# Patient Record
Sex: FEMALE | Race: WHITE | NOT HISPANIC OR LATINO | Employment: OTHER
[De-identification: names, ages, dates, MRNs, and addresses within clinical notes are randomized per-mention and may not be internally consistent; named-entity substitution may affect disease eponyms.]

---

## 2017-06-06 ENCOUNTER — RECORDS - HEALTHEAST (OUTPATIENT)
Dept: ADMINISTRATIVE | Facility: OTHER | Age: 77
End: 2017-06-06

## 2017-10-20 ENCOUNTER — RECORDS - HEALTHEAST (OUTPATIENT)
Dept: ADMINISTRATIVE | Facility: OTHER | Age: 77
End: 2017-10-20

## 2017-11-08 ENCOUNTER — COMMUNICATION - HEALTHEAST (OUTPATIENT)
Dept: SCHEDULING | Facility: CLINIC | Age: 77
End: 2017-11-08

## 2017-11-20 ENCOUNTER — OFFICE VISIT - HEALTHEAST (OUTPATIENT)
Dept: INTERNAL MEDICINE | Facility: CLINIC | Age: 77
End: 2017-11-20

## 2017-11-20 ENCOUNTER — AMBULATORY - HEALTHEAST (OUTPATIENT)
Dept: INTERNAL MEDICINE | Facility: CLINIC | Age: 77
End: 2017-11-20

## 2017-11-20 DIAGNOSIS — Z78.0 MENOPAUSE: ICD-10-CM

## 2017-11-20 DIAGNOSIS — F43.21 ADJUSTMENT DISORDER WITH DEPRESSED MOOD: ICD-10-CM

## 2017-11-20 DIAGNOSIS — J30.0 CHRONIC VASOMOTOR RHINITIS: ICD-10-CM

## 2017-11-20 ASSESSMENT — MIFFLIN-ST. JEOR: SCORE: 980.26

## 2017-12-03 ENCOUNTER — COMMUNICATION - HEALTHEAST (OUTPATIENT)
Dept: INTERNAL MEDICINE | Facility: CLINIC | Age: 77
End: 2017-12-03

## 2017-12-19 ENCOUNTER — RECORDS - HEALTHEAST (OUTPATIENT)
Dept: ADMINISTRATIVE | Facility: OTHER | Age: 77
End: 2017-12-19

## 2018-01-09 ENCOUNTER — OFFICE VISIT - HEALTHEAST (OUTPATIENT)
Dept: INTERNAL MEDICINE | Facility: CLINIC | Age: 78
End: 2018-01-09

## 2018-01-09 DIAGNOSIS — K44.9 HIATAL HERNIA: ICD-10-CM

## 2018-01-09 DIAGNOSIS — E55.9 VITAMIN D DEFICIENCY: ICD-10-CM

## 2018-01-09 DIAGNOSIS — E78.9 DISORDER OF LIPOID METABOLISM: ICD-10-CM

## 2018-01-09 DIAGNOSIS — F43.21 ADJUSTMENT DISORDER WITH DEPRESSED MOOD: ICD-10-CM

## 2018-05-15 ENCOUNTER — COMMUNICATION - HEALTHEAST (OUTPATIENT)
Dept: INTERNAL MEDICINE | Facility: CLINIC | Age: 78
End: 2018-05-15

## 2018-10-26 ENCOUNTER — COMMUNICATION - HEALTHEAST (OUTPATIENT)
Dept: INTERNAL MEDICINE | Facility: CLINIC | Age: 78
End: 2018-10-26

## 2020-01-30 ENCOUNTER — COMMUNICATION - HEALTHEAST (OUTPATIENT)
Dept: INTERNAL MEDICINE | Facility: CLINIC | Age: 80
End: 2020-01-30

## 2020-02-03 ENCOUNTER — COMMUNICATION - HEALTHEAST (OUTPATIENT)
Dept: SCHEDULING | Facility: CLINIC | Age: 80
End: 2020-02-03

## 2020-07-01 ENCOUNTER — RECORDS - HEALTHEAST (OUTPATIENT)
Dept: ADMINISTRATIVE | Facility: OTHER | Age: 80
End: 2020-07-01

## 2020-09-02 ENCOUNTER — OFFICE VISIT - HEALTHEAST (OUTPATIENT)
Dept: INTERNAL MEDICINE | Facility: CLINIC | Age: 80
End: 2020-09-02

## 2020-09-02 DIAGNOSIS — E78.5 HYPERLIPIDEMIA LDL GOAL <100: ICD-10-CM

## 2020-09-02 DIAGNOSIS — R35.1 NOCTURIA: ICD-10-CM

## 2020-09-02 DIAGNOSIS — J30.1 ALLERGIC RHINITIS DUE TO POLLEN, UNSPECIFIED SEASONALITY: ICD-10-CM

## 2020-09-02 DIAGNOSIS — M17.10 ARTHRITIS OF KNEE: ICD-10-CM

## 2020-09-02 DIAGNOSIS — Z00.00 ENCOUNTER FOR PREVENTIVE CARE: ICD-10-CM

## 2020-09-02 DIAGNOSIS — M85.80 OSTEOPENIA, UNSPECIFIED LOCATION: ICD-10-CM

## 2020-09-02 DIAGNOSIS — Z78.0 MENOPAUSE: ICD-10-CM

## 2020-09-02 LAB
ALBUMIN SERPL-MCNC: 4.2 G/DL (ref 3.5–5)
ALBUMIN UR-MCNC: ABNORMAL MG/DL
ALP SERPL-CCNC: 62 U/L (ref 45–120)
ALT SERPL W P-5'-P-CCNC: 17 U/L (ref 0–45)
AMORPH CRY #/AREA URNS HPF: ABNORMAL /[HPF]
ANION GAP SERPL CALCULATED.3IONS-SCNC: 10 MMOL/L (ref 5–18)
APPEARANCE UR: CLEAR
AST SERPL W P-5'-P-CCNC: 22 U/L (ref 0–40)
BACTERIA #/AREA URNS HPF: ABNORMAL HPF
BILIRUB SERPL-MCNC: 0.5 MG/DL (ref 0–1)
BILIRUB UR QL STRIP: ABNORMAL
BUN SERPL-MCNC: 13 MG/DL (ref 8–28)
CALCIUM SERPL-MCNC: 9.2 MG/DL (ref 8.5–10.5)
CHLORIDE BLD-SCNC: 106 MMOL/L (ref 98–107)
CHOLEST SERPL-MCNC: 216 MG/DL
CO2 SERPL-SCNC: 27 MMOL/L (ref 22–31)
COLOR UR AUTO: YELLOW
CREAT SERPL-MCNC: 0.74 MG/DL (ref 0.6–1.1)
ERYTHROCYTE [DISTWIDTH] IN BLOOD BY AUTOMATED COUNT: 12.4 % (ref 11–14.5)
FASTING STATUS PATIENT QL REPORTED: YES
GFR SERPL CREATININE-BSD FRML MDRD: >60 ML/MIN/1.73M2
GLUCOSE BLD-MCNC: 90 MG/DL (ref 70–125)
GLUCOSE UR STRIP-MCNC: NEGATIVE MG/DL
HCT VFR BLD AUTO: 40.3 % (ref 35–47)
HDLC SERPL-MCNC: 75 MG/DL
HGB BLD-MCNC: 13.3 G/DL (ref 12–16)
HGB UR QL STRIP: ABNORMAL
KETONES UR STRIP-MCNC: NEGATIVE MG/DL
LDLC SERPL CALC-MCNC: 127 MG/DL
LEUKOCYTE ESTERASE UR QL STRIP: ABNORMAL
MCH RBC QN AUTO: 28.8 PG (ref 27–34)
MCHC RBC AUTO-ENTMCNC: 32.9 G/DL (ref 32–36)
MCV RBC AUTO: 88 FL (ref 80–100)
MUCOUS THREADS #/AREA URNS LPF: ABNORMAL LPF
NITRATE UR QL: NEGATIVE
PH UR STRIP: 6 [PH] (ref 5–8)
PLATELET # BLD AUTO: 329 THOU/UL (ref 140–440)
PMV BLD AUTO: 7.6 FL (ref 7–10)
POTASSIUM BLD-SCNC: 4.2 MMOL/L (ref 3.5–5)
PROT SERPL-MCNC: 7.3 G/DL (ref 6–8)
RBC # BLD AUTO: 4.6 MILL/UL (ref 3.8–5.4)
RBC #/AREA URNS AUTO: ABNORMAL HPF
SODIUM SERPL-SCNC: 143 MMOL/L (ref 136–145)
SP GR UR STRIP: >=1.03 (ref 1–1.03)
SQUAMOUS #/AREA URNS AUTO: ABNORMAL LPF
TRANS CELLS #/AREA URNS HPF: ABNORMAL LPF
TRIGL SERPL-MCNC: 70 MG/DL
UROBILINOGEN UR STRIP-ACNC: ABNORMAL
WBC #/AREA URNS AUTO: ABNORMAL HPF
WBC CLUMPS #/AREA URNS HPF: PRESENT /[HPF]
WBC: 6.6 THOU/UL (ref 4–11)

## 2020-09-02 RX ORDER — CELECOXIB 100 MG/1
100 CAPSULE ORAL 2 TIMES DAILY
Status: SHIPPED | COMMUNITY
Start: 2020-09-02 | End: 2021-08-11

## 2020-09-02 RX ORDER — POLYETHYLENE GLYCOL 3350 17 G/17G
17 POWDER, FOR SOLUTION ORAL DAILY
Status: SHIPPED | COMMUNITY
Start: 2020-09-02

## 2020-09-02 RX ORDER — TIMOLOL MALEATE 5 MG/ML
1 SOLUTION/ DROPS OPHTHALMIC 2 TIMES DAILY
Status: SHIPPED | COMMUNITY
Start: 2020-09-02

## 2020-09-02 ASSESSMENT — MIFFLIN-ST. JEOR: SCORE: 940.55

## 2020-09-03 LAB
25(OH)D3 SERPL-MCNC: 53.5 NG/ML (ref 30–80)
25(OH)D3 SERPL-MCNC: 53.5 NG/ML (ref 30–80)
BACTERIA SPEC CULT: NO GROWTH

## 2020-09-08 ENCOUNTER — AMBULATORY - HEALTHEAST (OUTPATIENT)
Dept: SCHEDULING | Facility: CLINIC | Age: 80
End: 2020-09-08

## 2020-09-08 DIAGNOSIS — M85.80 OSTEOPENIA, UNSPECIFIED LOCATION: ICD-10-CM

## 2020-09-08 DIAGNOSIS — Z78.0 MENOPAUSE: ICD-10-CM

## 2020-09-13 ENCOUNTER — COMMUNICATION - HEALTHEAST (OUTPATIENT)
Dept: INTERNAL MEDICINE | Facility: CLINIC | Age: 80
End: 2020-09-13

## 2020-09-13 DIAGNOSIS — R31.29 MICROSCOPIC HEMATURIA: ICD-10-CM

## 2020-09-14 ENCOUNTER — COMMUNICATION - HEALTHEAST (OUTPATIENT)
Dept: INTERNAL MEDICINE | Facility: CLINIC | Age: 80
End: 2020-09-14

## 2020-09-23 ENCOUNTER — COMMUNICATION - HEALTHEAST (OUTPATIENT)
Dept: INTERNAL MEDICINE | Facility: CLINIC | Age: 80
End: 2020-09-23

## 2020-09-23 ENCOUNTER — HOSPITAL ENCOUNTER (OUTPATIENT)
Dept: CT IMAGING | Facility: CLINIC | Age: 80
Discharge: HOME | End: 2020-09-23
Attending: INTERNAL MEDICINE

## 2020-09-23 DIAGNOSIS — R31.29 MICROSCOPIC HEMATURIA: ICD-10-CM

## 2020-10-15 ENCOUNTER — COMMUNICATION - HEALTHEAST (OUTPATIENT)
Dept: INTERNAL MEDICINE | Facility: CLINIC | Age: 80
End: 2020-10-15

## 2020-10-15 DIAGNOSIS — J30.1 ALLERGIC RHINITIS DUE TO POLLEN, UNSPECIFIED SEASONALITY: ICD-10-CM

## 2020-10-28 ENCOUNTER — COMMUNICATION - HEALTHEAST (OUTPATIENT)
Dept: INTERNAL MEDICINE | Facility: CLINIC | Age: 80
End: 2020-10-28

## 2020-10-28 DIAGNOSIS — N30.00 ACUTE CYSTITIS WITHOUT HEMATURIA: ICD-10-CM

## 2020-10-29 ENCOUNTER — COMMUNICATION - HEALTHEAST (OUTPATIENT)
Dept: INTERNAL MEDICINE | Facility: CLINIC | Age: 80
End: 2020-10-29

## 2020-10-29 ENCOUNTER — AMBULATORY - HEALTHEAST (OUTPATIENT)
Dept: LAB | Facility: CLINIC | Age: 80
End: 2020-10-29

## 2020-10-29 ENCOUNTER — OFFICE VISIT - HEALTHEAST (OUTPATIENT)
Dept: INTERNAL MEDICINE | Facility: CLINIC | Age: 80
End: 2020-10-29

## 2020-10-29 ENCOUNTER — COMMUNICATION - HEALTHEAST (OUTPATIENT)
Dept: SCHEDULING | Facility: CLINIC | Age: 80
End: 2020-10-29

## 2020-10-29 DIAGNOSIS — R31.9 URINARY TRACT INFECTION WITH HEMATURIA, SITE UNSPECIFIED: ICD-10-CM

## 2020-10-29 DIAGNOSIS — N39.0 URINARY TRACT INFECTION WITH HEMATURIA, SITE UNSPECIFIED: ICD-10-CM

## 2020-10-29 DIAGNOSIS — N20.0 CALCULUS OF KIDNEY: ICD-10-CM

## 2020-10-29 LAB
ALBUMIN UR-MCNC: NEGATIVE MG/DL
APPEARANCE UR: CLEAR
BACTERIA #/AREA URNS HPF: ABNORMAL HPF
BILIRUB UR QL STRIP: NEGATIVE
COLOR UR AUTO: YELLOW
GLUCOSE UR STRIP-MCNC: NEGATIVE MG/DL
HGB UR QL STRIP: ABNORMAL
KETONES UR STRIP-MCNC: NEGATIVE MG/DL
LEUKOCYTE ESTERASE UR QL STRIP: NEGATIVE
NITRATE UR QL: NEGATIVE
PH UR STRIP: 6 [PH] (ref 5–8)
RBC #/AREA URNS AUTO: ABNORMAL HPF
SP GR UR STRIP: 1.02 (ref 1–1.03)
SQUAMOUS #/AREA URNS AUTO: ABNORMAL LPF
UROBILINOGEN UR STRIP-ACNC: ABNORMAL
WBC #/AREA URNS AUTO: ABNORMAL HPF

## 2020-10-30 ENCOUNTER — COMMUNICATION - HEALTHEAST (OUTPATIENT)
Dept: SCHEDULING | Facility: CLINIC | Age: 80
End: 2020-10-30

## 2020-10-31 ENCOUNTER — COMMUNICATION - HEALTHEAST (OUTPATIENT)
Dept: CARE COORDINATION | Facility: HOSPITAL | Age: 80
End: 2020-10-31

## 2020-10-31 DIAGNOSIS — Z20.822 EXPOSURE TO COVID-19 VIRUS: ICD-10-CM

## 2020-11-02 ENCOUNTER — TELEPHONE (OUTPATIENT)
Dept: FAMILY MEDICINE | Facility: CLINIC | Age: 80
End: 2020-11-02

## 2020-11-02 NOTE — TELEPHONE ENCOUNTER
[facility] phone call message- general phone call:    Reason for call: She was exposed to covid daughter in-law and would like to be tested.    Action desired: call back.    Return call needed: Yes    OK to leave a message on voice mail? Yes    Advised patient to response may take up to 2 business days: Yes    Primary language: Data Unavailable      needed? Data Unavailable    Call taken on November 2, 2020 at 12:28 PM by Jose Luna

## 2020-11-02 NOTE — TELEPHONE ENCOUNTER
"Called patient back to discuss COVID-19 testing. Patient reports she was able to schedule a test in [place]. She felt frustrated and reported she \"waited and waited for a phone call.\" Patient was called back in 1.5 hours. Discussed that we are often quite busy with triage calls and can't return calls immediately. Patient then hung up. ./LR  "

## 2020-11-03 ENCOUNTER — COMMUNICATION - HEALTHEAST (OUTPATIENT)
Dept: INTERNAL MEDICINE | Facility: CLINIC | Age: 80
End: 2020-11-03

## 2020-11-03 ENCOUNTER — AMBULATORY - HEALTHEAST (OUTPATIENT)
Dept: INTERNAL MEDICINE | Facility: CLINIC | Age: 80
End: 2020-11-03

## 2020-11-03 DIAGNOSIS — N20.0 KIDNEY STONE: ICD-10-CM

## 2020-11-03 DIAGNOSIS — N20.0 CALCULUS OF KIDNEY: ICD-10-CM

## 2020-11-04 ENCOUNTER — COMMUNICATION - HEALTHEAST (OUTPATIENT)
Dept: UROLOGY | Facility: CLINIC | Age: 80
End: 2020-11-04

## 2020-11-16 ENCOUNTER — RECORDS - HEALTHEAST (OUTPATIENT)
Dept: ADMINISTRATIVE | Facility: OTHER | Age: 80
End: 2020-11-16

## 2020-11-18 ENCOUNTER — COMMUNICATION - HEALTHEAST (OUTPATIENT)
Dept: INTERNAL MEDICINE | Facility: CLINIC | Age: 80
End: 2020-11-18

## 2020-11-24 ENCOUNTER — OFFICE VISIT - HEALTHEAST (OUTPATIENT)
Dept: INTERNAL MEDICINE | Facility: CLINIC | Age: 80
End: 2020-11-24

## 2020-11-24 DIAGNOSIS — R10.2 PELVIC PRESSURE IN FEMALE: ICD-10-CM

## 2020-11-24 DIAGNOSIS — R03.0 ELEVATED BP WITHOUT DIAGNOSIS OF HYPERTENSION: ICD-10-CM

## 2020-11-24 DIAGNOSIS — N20.0 CALCULUS OF KIDNEY: ICD-10-CM

## 2020-11-24 LAB
ALBUMIN SERPL-MCNC: 3.8 G/DL (ref 3.5–5)
ALBUMIN UR-MCNC: NEGATIVE MG/DL
ALP SERPL-CCNC: 67 U/L (ref 45–120)
ALT SERPL W P-5'-P-CCNC: 14 U/L (ref 0–45)
ANION GAP SERPL CALCULATED.3IONS-SCNC: 10 MMOL/L (ref 5–18)
APPEARANCE UR: CLEAR
AST SERPL W P-5'-P-CCNC: 19 U/L (ref 0–40)
BACTERIA #/AREA URNS HPF: ABNORMAL HPF
BILIRUB SERPL-MCNC: 0.3 MG/DL (ref 0–1)
BILIRUB UR QL STRIP: NEGATIVE
BUN SERPL-MCNC: 11 MG/DL (ref 8–28)
CALCIUM SERPL-MCNC: 9 MG/DL (ref 8.5–10.5)
CHLORIDE BLD-SCNC: 106 MMOL/L (ref 98–107)
CO2 SERPL-SCNC: 26 MMOL/L (ref 22–31)
COLOR UR AUTO: YELLOW
CREAT SERPL-MCNC: 0.62 MG/DL (ref 0.6–1.1)
ERYTHROCYTE [DISTWIDTH] IN BLOOD BY AUTOMATED COUNT: 11.9 % (ref 11–14.5)
GFR SERPL CREATININE-BSD FRML MDRD: >60 ML/MIN/1.73M2
GLUCOSE BLD-MCNC: 91 MG/DL (ref 70–125)
GLUCOSE UR STRIP-MCNC: NEGATIVE MG/DL
HCT VFR BLD AUTO: 35.4 % (ref 35–47)
HGB BLD-MCNC: 11.8 G/DL (ref 12–16)
HGB UR QL STRIP: ABNORMAL
KETONES UR STRIP-MCNC: NEGATIVE MG/DL
LEUKOCYTE ESTERASE UR QL STRIP: NEGATIVE
MCH RBC QN AUTO: 29.2 PG (ref 27–34)
MCHC RBC AUTO-ENTMCNC: 33.3 G/DL (ref 32–36)
MCV RBC AUTO: 88 FL (ref 80–100)
NITRATE UR QL: NEGATIVE
PH UR STRIP: 6 [PH] (ref 5–8)
PLATELET # BLD AUTO: 265 THOU/UL (ref 140–440)
PMV BLD AUTO: 8.1 FL (ref 7–10)
POTASSIUM BLD-SCNC: 3.7 MMOL/L (ref 3.5–5)
PROT SERPL-MCNC: 6.8 G/DL (ref 6–8)
PTH-INTACT SERPL-MCNC: 65 PG/ML (ref 10–86)
RBC # BLD AUTO: 4.03 MILL/UL (ref 3.8–5.4)
RBC #/AREA URNS AUTO: ABNORMAL HPF
SODIUM SERPL-SCNC: 142 MMOL/L (ref 136–145)
SP GR UR STRIP: 1.01 (ref 1–1.03)
SQUAMOUS #/AREA URNS AUTO: ABNORMAL LPF
UROBILINOGEN UR STRIP-ACNC: ABNORMAL
WBC #/AREA URNS AUTO: ABNORMAL HPF
WBC: 7.1 THOU/UL (ref 4–11)

## 2020-11-24 ASSESSMENT — MIFFLIN-ST. JEOR: SCORE: 962.1

## 2020-11-30 ENCOUNTER — COMMUNICATION - HEALTHEAST (OUTPATIENT)
Dept: INTERNAL MEDICINE | Facility: CLINIC | Age: 80
End: 2020-11-30

## 2020-11-30 ENCOUNTER — HOSPITAL ENCOUNTER (OUTPATIENT)
Dept: ULTRASOUND IMAGING | Facility: CLINIC | Age: 80
Discharge: HOME | End: 2020-11-30
Attending: INTERNAL MEDICINE

## 2020-11-30 DIAGNOSIS — R10.2 PELVIC PRESSURE IN FEMALE: ICD-10-CM

## 2020-12-01 ENCOUNTER — COMMUNICATION - HEALTHEAST (OUTPATIENT)
Dept: INTERNAL MEDICINE | Facility: CLINIC | Age: 80
End: 2020-12-01

## 2020-12-02 ENCOUNTER — RECORDS - HEALTHEAST (OUTPATIENT)
Dept: ADMINISTRATIVE | Facility: OTHER | Age: 80
End: 2020-12-02

## 2020-12-02 ENCOUNTER — AMBULATORY - HEALTHEAST (OUTPATIENT)
Dept: LAB | Facility: CLINIC | Age: 80
End: 2020-12-02

## 2020-12-02 DIAGNOSIS — R03.0 ELEVATED BP WITHOUT DIAGNOSIS OF HYPERTENSION: ICD-10-CM

## 2020-12-02 DIAGNOSIS — N20.0 CALCULUS OF KIDNEY: ICD-10-CM

## 2020-12-03 ENCOUNTER — COMMUNICATION - HEALTHEAST (OUTPATIENT)
Dept: INTERNAL MEDICINE | Facility: CLINIC | Age: 80
End: 2020-12-03

## 2020-12-03 LAB
CREAT 24H UR-MRATE: 0.7 G/24 HR (ref 0.8–1.8)
CREAT UR-MCNC: 24.5 MG/DL
SPECIMEN VOL UR: 2775 ML

## 2020-12-06 ENCOUNTER — COMMUNICATION - HEALTHEAST (OUTPATIENT)
Dept: INTERNAL MEDICINE | Facility: CLINIC | Age: 80
End: 2020-12-06

## 2020-12-06 LAB
CALCIUM 24H UR-MRATE: 150 MG/24HR (ref 20–275)
CHLORIDE 24H UR-SRATE: 136 MMOL/24HR (ref 110–250)
CITRATE 24H UR-MCNC: 68 MG/24HR
CREATININE, 24 HR URINE - HISTORICAL: 621.6 MG/24HR
OXALATE MG/SPEC: 38.9 MG/24HR (ref 7–44)
PH UR STRIP: 6.5 [PH] (ref 4.5–8)
PHOSPHORUS URINE MG/SPEC: 485.6 MG/24HR
POTASSIUM 24H UR-SCNC: 28 MMOL/24HR (ref 30–90)
SODIUM 24H UR-SRATE: 139 MMOL/24HR (ref 40–217)
SPECIMEN VOL UR: 2775 ML
URIC ACID URINE MG/SPEC: 352 MG/24HR (ref 250–750)

## 2020-12-07 ENCOUNTER — COMMUNICATION - HEALTHEAST (OUTPATIENT)
Dept: INTERNAL MEDICINE | Facility: CLINIC | Age: 80
End: 2020-12-07

## 2020-12-08 ENCOUNTER — AMBULATORY - HEALTHEAST (OUTPATIENT)
Dept: INTERNAL MEDICINE | Facility: CLINIC | Age: 80
End: 2020-12-08

## 2020-12-08 DIAGNOSIS — N20.0 KIDNEY STONE: ICD-10-CM

## 2020-12-08 RX ORDER — POTASSIUM CITRATE 15 MEQ/1
1 TABLET, EXTENDED RELEASE ORAL 3 TIMES DAILY
Qty: 180 TABLET | Refills: 4 | Status: SHIPPED | OUTPATIENT
Start: 2020-12-08 | End: 2021-08-11

## 2020-12-11 ENCOUNTER — COMMUNICATION - HEALTHEAST (OUTPATIENT)
Dept: SCHEDULING | Facility: CLINIC | Age: 80
End: 2020-12-11

## 2020-12-11 ENCOUNTER — COMMUNICATION - HEALTHEAST (OUTPATIENT)
Dept: INTERNAL MEDICINE | Facility: CLINIC | Age: 80
End: 2020-12-11

## 2020-12-14 ENCOUNTER — RECORDS - HEALTHEAST (OUTPATIENT)
Dept: ADMINISTRATIVE | Facility: OTHER | Age: 80
End: 2020-12-14

## 2020-12-15 ENCOUNTER — OFFICE VISIT - HEALTHEAST (OUTPATIENT)
Dept: INTERNAL MEDICINE | Facility: CLINIC | Age: 80
End: 2020-12-15

## 2020-12-15 ENCOUNTER — COMMUNICATION - HEALTHEAST (OUTPATIENT)
Dept: INTERNAL MEDICINE | Facility: CLINIC | Age: 80
End: 2020-12-15

## 2020-12-15 DIAGNOSIS — R10.2 PELVIC PAIN IN FEMALE: ICD-10-CM

## 2020-12-15 DIAGNOSIS — M85.80 LOW BONE MASS: ICD-10-CM

## 2020-12-15 DIAGNOSIS — N39.0 UTI (URINARY TRACT INFECTION): ICD-10-CM

## 2020-12-15 DIAGNOSIS — Z92.29 PERSONAL HISTORY OF OTHER DRUG THERAPY: ICD-10-CM

## 2020-12-15 DIAGNOSIS — R10.2 PELVIC PRESSURE IN FEMALE: ICD-10-CM

## 2020-12-15 DIAGNOSIS — M81.0 OSTEOPOROSIS: ICD-10-CM

## 2020-12-15 DIAGNOSIS — N20.0 RENAL LITHIASIS: ICD-10-CM

## 2020-12-15 DIAGNOSIS — R03.0 ELEVATED BP WITHOUT DIAGNOSIS OF HYPERTENSION: ICD-10-CM

## 2020-12-15 LAB
ALBUMIN UR-MCNC: NEGATIVE MG/DL
APPEARANCE UR: CLEAR
BACTERIA #/AREA URNS HPF: ABNORMAL HPF
BILIRUB UR QL STRIP: NEGATIVE
COLOR UR AUTO: YELLOW
GLUCOSE UR STRIP-MCNC: NEGATIVE MG/DL
HGB UR QL STRIP: ABNORMAL
KETONES UR STRIP-MCNC: NEGATIVE MG/DL
LEUKOCYTE ESTERASE UR QL STRIP: ABNORMAL
NITRATE UR QL: NEGATIVE
PH UR STRIP: 6.5 [PH] (ref 5–8)
RBC #/AREA URNS AUTO: ABNORMAL HPF
SP GR UR STRIP: 1.01 (ref 1–1.03)
SQUAMOUS #/AREA URNS AUTO: ABNORMAL LPF
UROBILINOGEN UR STRIP-ACNC: ABNORMAL
WBC #/AREA URNS AUTO: ABNORMAL HPF

## 2020-12-16 LAB — BACTERIA SPEC CULT: NO GROWTH

## 2020-12-17 ENCOUNTER — OFFICE VISIT - HEALTHEAST (OUTPATIENT)
Dept: UROLOGY | Facility: CLINIC | Age: 80
End: 2020-12-17

## 2020-12-17 DIAGNOSIS — N20.0 CALCULUS OF KIDNEY: ICD-10-CM

## 2020-12-28 ENCOUNTER — COMMUNICATION - HEALTHEAST (OUTPATIENT)
Dept: LAB | Facility: CLINIC | Age: 80
End: 2020-12-28

## 2020-12-28 DIAGNOSIS — M81.0 OSTEOPOROSIS: ICD-10-CM

## 2020-12-28 DIAGNOSIS — Z92.29 PERSONAL HISTORY OF OTHER DRUG THERAPY: ICD-10-CM

## 2020-12-28 DIAGNOSIS — M81.0 SENILE OSTEOPOROSIS: ICD-10-CM

## 2020-12-30 ENCOUNTER — AMBULATORY - HEALTHEAST (OUTPATIENT)
Dept: NURSING | Facility: CLINIC | Age: 80
End: 2020-12-30

## 2021-01-06 ENCOUNTER — COMMUNICATION - HEALTHEAST (OUTPATIENT)
Dept: SCHEDULING | Facility: CLINIC | Age: 81
End: 2021-01-06

## 2021-01-06 ENCOUNTER — SURGERY - HEALTHEAST (OUTPATIENT)
Dept: UROLOGY | Facility: CLINIC | Age: 81
End: 2021-01-06

## 2021-01-06 ENCOUNTER — AMBULATORY - HEALTHEAST (OUTPATIENT)
Dept: SURGERY | Facility: HOSPITAL | Age: 81
End: 2021-01-06

## 2021-01-06 DIAGNOSIS — N20.0 CALCULUS OF KIDNEY: ICD-10-CM

## 2021-01-06 DIAGNOSIS — Z11.59 ENCOUNTER FOR SCREENING FOR OTHER VIRAL DISEASES: ICD-10-CM

## 2021-01-08 ENCOUNTER — OFFICE VISIT - HEALTHEAST (OUTPATIENT)
Dept: INTERNAL MEDICINE | Facility: CLINIC | Age: 81
End: 2021-01-08

## 2021-01-08 DIAGNOSIS — Z01.818 PREOP EXAM FOR INTERNAL MEDICINE: ICD-10-CM

## 2021-01-08 DIAGNOSIS — H25.9 AGE-RELATED CATARACT OF BOTH EYES, UNSPECIFIED AGE-RELATED CATARACT TYPE: ICD-10-CM

## 2021-01-08 DIAGNOSIS — J30.1 ALLERGIC RHINITIS DUE TO POLLEN, UNSPECIFIED SEASONALITY: ICD-10-CM

## 2021-01-08 ASSESSMENT — MIFFLIN-ST. JEOR: SCORE: 962.1

## 2021-01-10 ENCOUNTER — AMBULATORY - HEALTHEAST (OUTPATIENT)
Dept: FAMILY MEDICINE | Facility: CLINIC | Age: 81
End: 2021-01-10

## 2021-01-10 DIAGNOSIS — Z11.59 ENCOUNTER FOR SCREENING FOR OTHER VIRAL DISEASES: ICD-10-CM

## 2021-01-11 ENCOUNTER — COMMUNICATION - HEALTHEAST (OUTPATIENT)
Dept: SCHEDULING | Facility: CLINIC | Age: 81
End: 2021-01-11

## 2021-01-11 LAB
SARS-COV-2 PCR COMMENT: NORMAL
SARS-COV-2 RNA SPEC QL NAA+PROBE: NEGATIVE
SARS-COV-2 VIRUS SPECIMEN SOURCE: NORMAL

## 2021-01-13 ENCOUNTER — ANESTHESIA - HEALTHEAST (OUTPATIENT)
Dept: SURGERY | Facility: HOSPITAL | Age: 81
End: 2021-01-13

## 2021-01-13 ENCOUNTER — SURGERY - HEALTHEAST (OUTPATIENT)
Dept: SURGERY | Facility: HOSPITAL | Age: 81
End: 2021-01-13

## 2021-01-15 ENCOUNTER — COMMUNICATION - HEALTHEAST (OUTPATIENT)
Dept: UROLOGY | Facility: CLINIC | Age: 81
End: 2021-01-15

## 2021-01-15 ENCOUNTER — AMBULATORY - HEALTHEAST (OUTPATIENT)
Dept: UROLOGY | Facility: CLINIC | Age: 81
End: 2021-01-15

## 2021-01-15 DIAGNOSIS — N20.0 CALCULUS OF KIDNEY: ICD-10-CM

## 2021-01-19 ENCOUNTER — AMBULATORY - HEALTHEAST (OUTPATIENT)
Dept: UROLOGY | Facility: CLINIC | Age: 81
End: 2021-01-19

## 2021-01-20 ENCOUNTER — OFFICE VISIT - HEALTHEAST (OUTPATIENT)
Dept: INTERNAL MEDICINE | Facility: CLINIC | Age: 81
End: 2021-01-20

## 2021-01-20 ENCOUNTER — COMMUNICATION - HEALTHEAST (OUTPATIENT)
Dept: INTERNAL MEDICINE | Facility: CLINIC | Age: 81
End: 2021-01-20

## 2021-01-20 DIAGNOSIS — R11.0 NAUSEA: ICD-10-CM

## 2021-01-20 DIAGNOSIS — K21.00 GASTROESOPHAGEAL REFLUX DISEASE WITH ESOPHAGITIS WITHOUT HEMORRHAGE: ICD-10-CM

## 2021-01-20 DIAGNOSIS — Z09 HOSPITAL DISCHARGE FOLLOW-UP: ICD-10-CM

## 2021-01-20 DIAGNOSIS — N20.0 RENAL LITHIASIS: ICD-10-CM

## 2021-01-20 DIAGNOSIS — D50.9 IRON DEFICIENCY ANEMIA, UNSPECIFIED IRON DEFICIENCY ANEMIA TYPE: ICD-10-CM

## 2021-01-20 DIAGNOSIS — I73.00 RAYNAUD'S DISEASE WITHOUT GANGRENE: ICD-10-CM

## 2021-01-20 LAB
ANION GAP SERPL CALCULATED.3IONS-SCNC: 9 MMOL/L (ref 5–18)
BUN SERPL-MCNC: 11 MG/DL (ref 8–28)
CALCIUM SERPL-MCNC: 8.9 MG/DL (ref 8.5–10.5)
CHLORIDE BLD-SCNC: 104 MMOL/L (ref 98–107)
CO2 SERPL-SCNC: 25 MMOL/L (ref 22–31)
CREAT SERPL-MCNC: 0.64 MG/DL (ref 0.6–1.1)
ERYTHROCYTE [DISTWIDTH] IN BLOOD BY AUTOMATED COUNT: 12 % (ref 11–14.5)
GFR SERPL CREATININE-BSD FRML MDRD: >60 ML/MIN/1.73M2
GLUCOSE BLD-MCNC: 85 MG/DL (ref 70–125)
HCT VFR BLD AUTO: 36.2 % (ref 35–47)
HGB BLD-MCNC: 12.3 G/DL (ref 12–16)
MCH RBC QN AUTO: 28.7 PG (ref 27–34)
MCHC RBC AUTO-ENTMCNC: 34 G/DL (ref 32–36)
MCV RBC AUTO: 84 FL (ref 80–100)
PLATELET # BLD AUTO: 284 THOU/UL (ref 140–440)
PMV BLD AUTO: 7.3 FL (ref 7–10)
POTASSIUM BLD-SCNC: 4.1 MMOL/L (ref 3.5–5)
RBC # BLD AUTO: 4.3 MILL/UL (ref 3.8–5.4)
SODIUM SERPL-SCNC: 138 MMOL/L (ref 136–145)
WBC: 8.2 THOU/UL (ref 4–11)

## 2021-01-30 ENCOUNTER — HEALTH MAINTENANCE LETTER (OUTPATIENT)
Age: 81
End: 2021-01-30

## 2021-02-03 ENCOUNTER — IMMUNIZATION (OUTPATIENT)
Dept: FAMILY MEDICINE | Facility: OTHER | Age: 81
End: 2021-02-03
Payer: MEDICARE

## 2021-02-03 PROCEDURE — 91300 PR COVID VAC PFIZER DIL RECON 30 MCG/0.3 ML IM: CPT

## 2021-02-12 ENCOUNTER — AMBULATORY - HEALTHEAST (OUTPATIENT)
Dept: LAB | Facility: CLINIC | Age: 81
End: 2021-02-12

## 2021-02-12 ENCOUNTER — COMMUNICATION - HEALTHEAST (OUTPATIENT)
Dept: UROLOGY | Facility: CLINIC | Age: 81
End: 2021-02-12

## 2021-02-12 DIAGNOSIS — N20.0 CALCULUS OF KIDNEY: ICD-10-CM

## 2021-02-12 LAB
ALBUMIN UR-MCNC: NEGATIVE MG/DL
APPEARANCE UR: CLEAR
BACTERIA #/AREA URNS HPF: ABNORMAL HPF
BILIRUB UR QL STRIP: NEGATIVE
COLOR UR AUTO: ABNORMAL
GLUCOSE UR STRIP-MCNC: NEGATIVE MG/DL
HGB UR QL STRIP: NEGATIVE
KETONES UR STRIP-MCNC: NEGATIVE MG/DL
LEUKOCYTE ESTERASE UR QL STRIP: ABNORMAL
NITRATE UR QL: NEGATIVE
PH UR STRIP: 6 [PH] (ref 4.5–8)
RBC #/AREA URNS AUTO: ABNORMAL HPF
SP GR UR STRIP: 1 (ref 1–1.03)
SQUAMOUS #/AREA URNS AUTO: ABNORMAL LPF
UROBILINOGEN UR STRIP-ACNC: ABNORMAL
WBC #/AREA URNS AUTO: ABNORMAL HPF

## 2021-02-13 LAB — BACTERIA SPEC CULT: NO GROWTH

## 2021-02-16 ENCOUNTER — HOSPITAL ENCOUNTER (OUTPATIENT)
Dept: CT IMAGING | Facility: CLINIC | Age: 81
Discharge: HOME | End: 2021-02-16

## 2021-02-16 DIAGNOSIS — N20.0 CALCULUS OF KIDNEY: ICD-10-CM

## 2021-02-18 ENCOUNTER — OFFICE VISIT - HEALTHEAST (OUTPATIENT)
Dept: UROLOGY | Facility: CLINIC | Age: 81
End: 2021-02-18

## 2021-02-18 DIAGNOSIS — N20.1 CALCULUS OF URETER: ICD-10-CM

## 2021-02-24 ENCOUNTER — IMMUNIZATION (OUTPATIENT)
Dept: FAMILY MEDICINE | Facility: OTHER | Age: 81
End: 2021-02-24
Attending: FAMILY MEDICINE
Payer: MEDICARE

## 2021-02-24 PROCEDURE — 91300 PR COVID VAC PFIZER DIL RECON 30 MCG/0.3 ML IM: CPT

## 2021-03-03 ENCOUNTER — HOSPITAL ENCOUNTER (OUTPATIENT)
Dept: CT IMAGING | Facility: CLINIC | Age: 81
Discharge: HOME | End: 2021-03-03
Attending: UROLOGY

## 2021-03-03 DIAGNOSIS — N20.1 CALCULUS OF URETER: ICD-10-CM

## 2021-03-04 ENCOUNTER — OFFICE VISIT - HEALTHEAST (OUTPATIENT)
Dept: UROLOGY | Facility: CLINIC | Age: 81
End: 2021-03-04

## 2021-03-04 DIAGNOSIS — N20.1 CALCULUS OF URETER: ICD-10-CM

## 2021-03-06 ENCOUNTER — COMMUNICATION - HEALTHEAST (OUTPATIENT)
Dept: INTERNAL MEDICINE | Facility: CLINIC | Age: 81
End: 2021-03-06

## 2021-04-07 ENCOUNTER — COMMUNICATION - HEALTHEAST (OUTPATIENT)
Dept: UROLOGY | Facility: CLINIC | Age: 81
End: 2021-04-07

## 2021-04-14 ENCOUNTER — HOSPITAL ENCOUNTER (OUTPATIENT)
Dept: CT IMAGING | Facility: CLINIC | Age: 81
Discharge: HOME | End: 2021-04-14
Attending: UROLOGY

## 2021-04-14 DIAGNOSIS — N20.1 CALCULUS OF URETER: ICD-10-CM

## 2021-04-15 ENCOUNTER — OFFICE VISIT - HEALTHEAST (OUTPATIENT)
Dept: UROLOGY | Facility: CLINIC | Age: 81
End: 2021-04-15

## 2021-04-15 DIAGNOSIS — N20.1 CALCULUS OF URETER: ICD-10-CM

## 2021-05-03 ENCOUNTER — RECORDS - HEALTHEAST (OUTPATIENT)
Dept: ADMINISTRATIVE | Facility: OTHER | Age: 81
End: 2021-05-03

## 2021-05-07 ENCOUNTER — COMMUNICATION - HEALTHEAST (OUTPATIENT)
Dept: INTERNAL MEDICINE | Facility: CLINIC | Age: 81
End: 2021-05-07

## 2021-05-19 ENCOUNTER — AMBULATORY - HEALTHEAST (OUTPATIENT)
Dept: INTERNAL MEDICINE | Facility: CLINIC | Age: 81
End: 2021-05-19

## 2021-05-19 ENCOUNTER — COMMUNICATION - HEALTHEAST (OUTPATIENT)
Dept: INTERNAL MEDICINE | Facility: CLINIC | Age: 81
End: 2021-05-19
Payer: MEDICARE

## 2021-05-19 ENCOUNTER — HOSPITAL ENCOUNTER (OUTPATIENT)
Dept: CT IMAGING | Facility: CLINIC | Age: 81
Discharge: HOME | End: 2021-05-19
Attending: UROLOGY
Payer: MEDICARE

## 2021-05-19 ENCOUNTER — COMMUNICATION - HEALTHEAST (OUTPATIENT)
Dept: INTERNAL MEDICINE | Facility: CLINIC | Age: 81
End: 2021-05-19

## 2021-05-19 DIAGNOSIS — R91.8 GROUND GLASS OPACITY PRESENT ON IMAGING OF LUNG: ICD-10-CM

## 2021-05-19 DIAGNOSIS — N20.1 CALCULUS OF URETER: ICD-10-CM

## 2021-05-20 ENCOUNTER — RECORDS - HEALTHEAST (OUTPATIENT)
Dept: ADMINISTRATIVE | Facility: OTHER | Age: 81
End: 2021-05-20

## 2021-05-20 ENCOUNTER — AMBULATORY - HEALTHEAST (OUTPATIENT)
Dept: UROLOGY | Facility: CLINIC | Age: 81
End: 2021-05-20

## 2021-05-20 ENCOUNTER — OFFICE VISIT - HEALTHEAST (OUTPATIENT)
Dept: UROLOGY | Facility: CLINIC | Age: 81
End: 2021-05-20

## 2021-05-20 DIAGNOSIS — N20.1 CALCULUS OF URETER: ICD-10-CM

## 2021-05-20 DIAGNOSIS — Z11.59 ENCOUNTER FOR SCREENING FOR OTHER VIRAL DISEASES: ICD-10-CM

## 2021-05-20 DIAGNOSIS — N20.0 CALCULUS OF KIDNEY: ICD-10-CM

## 2021-05-23 ENCOUNTER — HOSPITAL ENCOUNTER (OUTPATIENT)
Dept: CT IMAGING | Facility: CLINIC | Age: 81
Discharge: HOME | End: 2021-05-23
Attending: INTERNAL MEDICINE
Payer: MEDICARE

## 2021-05-23 DIAGNOSIS — R91.8 GROUND GLASS OPACITY PRESENT ON IMAGING OF LUNG: ICD-10-CM

## 2021-05-24 ENCOUNTER — AMBULATORY - HEALTHEAST (OUTPATIENT)
Dept: INTERNAL MEDICINE | Facility: CLINIC | Age: 81
End: 2021-05-24

## 2021-05-24 DIAGNOSIS — R91.8 GROUND GLASS OPACITY PRESENT ON IMAGING OF LUNG: ICD-10-CM

## 2021-05-25 ENCOUNTER — COMMUNICATION - HEALTHEAST (OUTPATIENT)
Dept: FAMILY MEDICINE | Facility: CLINIC | Age: 81
End: 2021-05-25

## 2021-05-25 ENCOUNTER — OFFICE VISIT - HEALTHEAST (OUTPATIENT)
Dept: INTERNAL MEDICINE | Facility: CLINIC | Age: 81
End: 2021-05-25

## 2021-05-25 ENCOUNTER — AMBULATORY - HEALTHEAST (OUTPATIENT)
Dept: LAB | Facility: CLINIC | Age: 81
End: 2021-05-25

## 2021-05-25 DIAGNOSIS — M19.90 INFLAMMATORY OSTEOARTHRITIS: ICD-10-CM

## 2021-05-25 DIAGNOSIS — Z00.00 ENCOUNTER FOR PREVENTIVE CARE: ICD-10-CM

## 2021-05-25 DIAGNOSIS — R91.8 GROUND GLASS OPACITY PRESENT ON IMAGING OF LUNG: ICD-10-CM

## 2021-05-25 DIAGNOSIS — Z01.818 PRE-OP EXAM: ICD-10-CM

## 2021-05-25 DIAGNOSIS — E78.5 HYPERLIPIDEMIA LDL GOAL <100: ICD-10-CM

## 2021-05-25 DIAGNOSIS — Z11.59 ENCOUNTER FOR SCREENING FOR OTHER VIRAL DISEASES: ICD-10-CM

## 2021-05-25 DIAGNOSIS — N20.0 RENAL LITHIASIS: ICD-10-CM

## 2021-05-25 DIAGNOSIS — M85.80 OSTEOPENIA, UNSPECIFIED LOCATION: ICD-10-CM

## 2021-05-25 LAB
ANION GAP SERPL CALCULATED.3IONS-SCNC: 15 MMOL/L (ref 5–18)
BUN SERPL-MCNC: 14 MG/DL (ref 8–28)
CALCIUM SERPL-MCNC: 9.3 MG/DL (ref 8.5–10.5)
CHLORIDE BLD-SCNC: 103 MMOL/L (ref 98–107)
CO2 SERPL-SCNC: 23 MMOL/L (ref 22–31)
CREAT SERPL-MCNC: 0.65 MG/DL (ref 0.6–1.1)
ERYTHROCYTE [DISTWIDTH] IN BLOOD BY AUTOMATED COUNT: 13.2 % (ref 11–14.5)
GFR SERPL CREATININE-BSD FRML MDRD: >60 ML/MIN/1.73M2
GLUCOSE BLD-MCNC: 71 MG/DL (ref 70–125)
HCT VFR BLD AUTO: 38.1 % (ref 35–47)
HGB BLD-MCNC: 12.1 G/DL (ref 12–16)
MCH RBC QN AUTO: 28.6 PG (ref 27–34)
MCHC RBC AUTO-ENTMCNC: 31.8 G/DL (ref 32–36)
MCV RBC AUTO: 90 FL (ref 80–100)
PLATELET # BLD AUTO: 276 THOU/UL (ref 140–440)
PMV BLD AUTO: 9.5 FL (ref 7–10)
POTASSIUM BLD-SCNC: 3.8 MMOL/L (ref 3.5–5)
RBC # BLD AUTO: 4.23 MILL/UL (ref 3.8–5.4)
SODIUM SERPL-SCNC: 141 MMOL/L (ref 136–145)
WBC: 7.1 THOU/UL (ref 4–11)

## 2021-05-25 RX ORDER — FAMOTIDINE 20 MG/1
20 TABLET, FILM COATED ORAL 2 TIMES DAILY
Status: SHIPPED | COMMUNITY
Start: 2021-05-25 | End: 2021-08-11

## 2021-05-25 RX ORDER — ROSUVASTATIN CALCIUM 5 MG/1
5 TABLET, COATED ORAL DAILY
Qty: 90 TABLET | Refills: 0 | Status: SHIPPED | OUTPATIENT
Start: 2021-05-25 | End: 2021-09-14

## 2021-05-25 RX ORDER — AMOXICILLIN 500 MG/1
TABLET, FILM COATED ORAL
Qty: 20 TABLET | Refills: 0 | Status: SHIPPED | OUTPATIENT
Start: 2021-05-25

## 2021-05-25 ASSESSMENT — MIFFLIN-ST. JEOR: SCORE: 963.24

## 2021-05-26 ENCOUNTER — RECORDS - HEALTHEAST (OUTPATIENT)
Dept: ADMINISTRATIVE | Facility: OTHER | Age: 81
End: 2021-05-26

## 2021-05-26 ENCOUNTER — OFFICE VISIT - HEALTHEAST (OUTPATIENT)
Dept: PULMONOLOGY | Facility: OTHER | Age: 81
End: 2021-05-26

## 2021-05-26 ENCOUNTER — AMBULATORY - HEALTHEAST (OUTPATIENT)
Dept: LAB | Facility: HOSPITAL | Age: 81
End: 2021-05-26

## 2021-05-26 ENCOUNTER — COMMUNICATION - HEALTHEAST (OUTPATIENT)
Dept: SCHEDULING | Facility: CLINIC | Age: 81
End: 2021-05-26

## 2021-05-26 ENCOUNTER — COMMUNICATION - HEALTHEAST (OUTPATIENT)
Dept: PULMONOLOGY | Facility: OTHER | Age: 81
End: 2021-05-26

## 2021-05-26 DIAGNOSIS — R91.8 ABNORMAL CT LUNG SCREENING: ICD-10-CM

## 2021-05-26 DIAGNOSIS — R91.8 PULMONARY NODULES: ICD-10-CM

## 2021-05-26 LAB
ATRIAL RATE - MUSE: 74 BPM
DIASTOLIC BLOOD PRESSURE - MUSE: NORMAL
INTERPRETATION ECG - MUSE: NORMAL
P AXIS - MUSE: 43 DEGREES
PR INTERVAL - MUSE: 168 MS
QRS DURATION - MUSE: 84 MS
QT - MUSE: 390 MS
QTC - MUSE: 432 MS
R AXIS - MUSE: 12 DEGREES
SARS-COV-2 PCR COMMENT: NORMAL
SARS-COV-2 RNA SPEC QL NAA+PROBE: NEGATIVE
SARS-COV-2 VIRUS SPECIMEN SOURCE: NORMAL
SYSTOLIC BLOOD PRESSURE - MUSE: NORMAL
T AXIS - MUSE: 43 DEGREES
VENTRICULAR RATE- MUSE: 74 BPM

## 2021-05-26 ASSESSMENT — MIFFLIN-ST. JEOR
SCORE: 962.1
SCORE: 968

## 2021-05-27 ENCOUNTER — ANESTHESIA - HEALTHEAST (OUTPATIENT)
Dept: SURGERY | Facility: AMBULATORY SURGERY CENTER | Age: 81
End: 2021-05-27

## 2021-05-27 ENCOUNTER — COMMUNICATION - HEALTHEAST (OUTPATIENT)
Dept: SCHEDULING | Facility: CLINIC | Age: 81
End: 2021-05-27

## 2021-05-27 ENCOUNTER — COMMUNICATION - HEALTHEAST (OUTPATIENT)
Dept: UROLOGY | Facility: CLINIC | Age: 81
End: 2021-05-27
Payer: MEDICARE

## 2021-05-28 ENCOUNTER — SURGERY - HEALTHEAST (OUTPATIENT)
Dept: SURGERY | Facility: AMBULATORY SURGERY CENTER | Age: 81
End: 2021-05-28
Payer: MEDICARE

## 2021-05-29 LAB — COCCIDIOIDES AB TITR SER CF: NORMAL {TITER}

## 2021-05-30 ENCOUNTER — COMMUNICATION - HEALTHEAST (OUTPATIENT)
Dept: PULMONOLOGY | Facility: OTHER | Age: 81
End: 2021-05-30
Payer: MEDICARE

## 2021-05-30 LAB
B DERMAT AB SER QL ID: NORMAL
H CAPSUL AB SER QL ID: NORMAL
H CAPSUL MYC AB TITR SER CF: NORMAL {TITER}
H CAPSUL YST AB TITR SER CF: NORMAL {TITER}

## 2021-05-31 VITALS — HEIGHT: 62 IN | WEIGHT: 123.9 LBS | BODY MASS INDEX: 22.8 KG/M2

## 2021-05-31 VITALS — BODY MASS INDEX: 22.68 KG/M2 | WEIGHT: 124 LBS

## 2021-06-01 ENCOUNTER — AMBULATORY - HEALTHEAST (OUTPATIENT)
Dept: LAB | Facility: CLINIC | Age: 81
End: 2021-06-01

## 2021-06-01 DIAGNOSIS — Z01.818 PRE-OP EXAM: ICD-10-CM

## 2021-06-01 DIAGNOSIS — R91.8 PULMONARY NODULES: ICD-10-CM

## 2021-06-01 LAB
ALBUMIN UR-MCNC: ABNORMAL G/DL
APPEARANCE UR: ABNORMAL
BACTERIA #/AREA URNS HPF: ABNORMAL /[HPF]
BILIRUB UR QL STRIP: NEGATIVE
COLOR UR AUTO: YELLOW
GLUCOSE UR STRIP-MCNC: NEGATIVE MG/DL
HGB UR QL STRIP: ABNORMAL
KETONES UR STRIP-MCNC: NEGATIVE MG/DL
LEUKOCYTE ESTERASE UR QL STRIP: ABNORMAL
NITRATE UR QL: NEGATIVE
PH UR STRIP: 6 [PH] (ref 5–8)
RBC URINE: >182 HPF
SP GR UR STRIP: 1.02 (ref 1–1.03)
SQUAMOUS EPITHELIAL: 0 /HPF
UROBILINOGEN UR STRIP-ACNC: ABNORMAL
WBC URINE: 74 HPF

## 2021-06-02 ENCOUNTER — RECORDS - HEALTHEAST (OUTPATIENT)
Dept: ADMINISTRATIVE | Facility: OTHER | Age: 81
End: 2021-06-02

## 2021-06-02 ENCOUNTER — COMMUNICATION - HEALTHEAST (OUTPATIENT)
Dept: MULTI SPECIALTY CLINIC | Facility: CLINIC | Age: 81
End: 2021-06-02

## 2021-06-02 ENCOUNTER — HOSPITAL ENCOUNTER (OUTPATIENT)
Dept: RADIOLOGY | Facility: HOSPITAL | Age: 81
Discharge: HOME | End: 2021-06-02
Attending: INTERNAL MEDICINE
Payer: MEDICARE

## 2021-06-02 DIAGNOSIS — R91.8 PULMONARY NODULES: ICD-10-CM

## 2021-06-02 LAB — BACTERIA SPEC CULT: NO GROWTH

## 2021-06-03 ENCOUNTER — COMMUNICATION - HEALTHEAST (OUTPATIENT)
Dept: PULMONOLOGY | Facility: OTHER | Age: 81
End: 2021-06-03

## 2021-06-03 ENCOUNTER — AMBULATORY - HEALTHEAST (OUTPATIENT)
Dept: UROLOGY | Facility: CLINIC | Age: 81
End: 2021-06-03

## 2021-06-03 DIAGNOSIS — R91.8 PULMONARY NODULES: ICD-10-CM

## 2021-06-05 NOTE — TELEPHONE ENCOUNTER
Who is calling:  Patient  Reason for Call:  Patient is wondering if you would accept her as her primary physician, her former physician was Dr. Smith. Patient's  was Dr. Bakari Doyle and the family is also affiliated with the [facility] at [facility].  Okay to leave a detailed message: Yes

## 2021-06-05 NOTE — TELEPHONE ENCOUNTER
New Appointment Needed  What is the reason for the visit:    AWV.  Unable to schedule new Patient Physical.  Per phone encounter.PT is Ok waiting until May to see Dr Garcia.  System will not allow us to Schedule this per Dr.  Provider Preference: Dr Garcia  How soon do you need to be seen?: Some time in May  Waitlist offered?: No  Okay to leave a detailed message:  Yes

## 2021-06-11 NOTE — TELEPHONE ENCOUNTER
Spoke with pt and relayed pcp message.  Pt understanding.  Reports she contacted Urology and got an appointment for early November.  Pt wondering if she has to be seen sooner?  If so, will need assistance getting in sooner.

## 2021-06-11 NOTE — PROGRESS NOTES
Assessment and Plan:   Annual wellness forms reviewed.  No new needs identified    1. Encounter for preventive care/encounter to establish care  Electronic health record reviewed.  All areas updated.  Up-to-date with regard to ophthalmologic, dental and Derm care.  She sees Dr. Negro Louis for ophthalmologic care.  No history of colon cancer.  Mammography-according to the patient was done last year.  She is due in the fall.  She is due for an update on bone densitometry.  Encourage regular exercise.  Her weight is optimal.  - HM2(CBC w/o Differential)  - Comprehensive Metabolic Panel    2. Hyperlipidemia LDL goal <100  Coronary calcium scan was done and was negative.  - rosuvastatin (CRESTOR) 5 MG tablet; Take 1 tablet (5 mg total) by mouth daily.  Dispense: 90 tablet; Refill: 0  - Comprehensive Metabolic Panel  - Lipid Cascade FASTING    3. Allergic rhinitis due to pollen, unspecified seasonality  Encourage medication holiday.  - pseudoephedrine-guaifenesin (MUCINEX D)  mg per tablet; Take 1 tablet by mouth every 12 (twelve) hours.  Dispense: 90 tablet; Refill: 0    4. Osteopenia, unspecified location  We will update bone density  - DXA Bone Density Scan; Future  - Vitamin D, Total (25-Hydroxy)    5. Menopause    - DXA Bone Density Scan; Future    6. Arthritis of knee  She is working with [facility].  Encourage short bursts of activity    7. Nocturia  With some question of vaginal discharge versus urinary leakage.  Has nocturia.  Recommendation: Will check urine analysis.  Continue with over-the-counter urinary urgency supplement.  Change poise pad to a different brand to see if this helps.  Can return for a pelvic if needed  - Urinalysis-UC if Indicated     The patient's current medical problems were reviewed.      The following health maintenance schedule was reviewed with the patient and provided in printed form in the after visit summary:   Health Maintenance   Topic Date Due      "ZOSTER VACCINES (2 of 3) 2007     DXA SCAN  2019     TD 18+ HE  2021     MEDICARE ANNUAL WELLNESS VISIT  2021     FALL RISK ASSESSMENT  2021     ADVANCE CARE PLANNING  2022     PNEUMOCOCCAL IMMUNIZATION 65+ LOW/MEDIUM RISK  Completed     HEPATITIS B VACCINES  Aged Out        Subjective:   Chief Complaint: Shelbie Doyle is an 80 y.o. female here for an Annual Wellness visit.   HPI: Shelbie is a pleasant 80-year-old female who is here today to establish care.  She is previously been under the care of Dr. Kailyn Navarrete.  Of note, also, she has seen Dr. Esther Smith 2 to 3 years ago.  She is a  of [employer] who started the [facility].    She states that overall, she is doing well.  She is dealing with bilateral knee arthritis and working with [facility].  She would not like to have a knee replacement if at all necessary.  She does describe being \"bone-on-bone \".  She does not do as much walking as she used to but describes herself as being busy every day.  She has not had any recent falls.    Health maintenance is reviewed and updated in the chart.  Have discussed with her shingles vaccine.    Review of Systems:    Please see above.  The rest of the review of systems are negative for all systems.    Patient Care Team:  Esther Smith MD as Assigned PCP     Patient Active Problem List   Diagnosis     Disorder of lipoid metabolism     Inflammatory osteoarthritis     Osteopenia     Postherpetic neuralgia     Vasomotor rhinitis     Hiatal hernia     History reviewed. No pertinent past medical history.   Past Surgical History:   Procedure Laterality Date     CARPAL TUNNEL RELEASE       FACIAL COSMETIC SURGERY       REDUCTION MAMMAPLASTY        Family History   Problem Relation Age of Onset     Heart disease Mother          age 78 of MI     Heart disease Father      Prostate cancer Father          age 86     " Mental illness Sister      Heart disease Brother       Social History     Socioeconomic History     Marital status:      Spouse name: Not on file     Number of children: Not on file     Years of education: Not on file     Highest education level: Not on file   Occupational History     Not on file   Social Needs     Financial resource strain: Not on file     Food insecurity     Worry: Not on file     Inability: Not on file     Transportation needs     Medical: Not on file     Non-medical: Not on file   Tobacco Use     Smoking status: Never Smoker     Smokeless tobacco: Never Used   Substance and Sexual Activity     Alcohol use: Not on file     Drug use: Not on file     Sexual activity: Not on file   Lifestyle     Physical activity     Days per week: Not on file     Minutes per session: Not on file     Stress: Not on file   Relationships     Social connections     Talks on phone: Not on file     Gets together: Not on file     Attends [employer] service: Not on file     Active member of club or organization: Not on file     Attends meetings of clubs or organizations: Not on file     Relationship status: Not on file     Intimate partner violence     Fear of current or ex partner: Not on file     Emotionally abused: Not on file     Physically abused: Not on file     Forced sexual activity: Not on file   Other Topics Concern     Not on file   Social History Narrative    She is  (1951) and her  has delusional dementia and parkinson's disease and started the [facility] at .  They have 3 sons, 6 grandchildren.  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      Current Outpatient Medications   Medication Sig Dispense Refill     celecoxib (CELEBREX) 100 MG capsule Take 100 mg by mouth 2 (two) times a day.       omega-3/dha/epa/dpa/fish oil (OMEGA-3 2100 ORAL) Take by mouth.       polyethylene glycol (MIRALAX) 17 gram packet Take 17 g by mouth daily.       rosuvastatin (CRESTOR)  "5 MG tablet Take 1 tablet (5 mg total) by mouth daily. 90 tablet 0     Saccharomyces boulardii (FLORASTOR) 250 mg capsule Take 250 mg by mouth 2 (two) times a day.       timoloL maleate (TIMOPTIC) 0.5 % ophthalmic solution 1 drop 2 (two) times a day.       UNABLE TO FIND Med Name: multi collagen joint mobility       montelukast (SINGULAIR) 10 mg tablet Take 1 tablet (10 mg total) by mouth daily. 90 tablet 3     pseudoephedrine-guaifenesin (MUCINEX D)  mg per tablet Take 1 tablet by mouth every 12 (twelve) hours. 90 tablet 0     No current facility-administered medications for this visit.       Objective:   Vital Signs:   Visit Vitals  /66 (Patient Site: Right Arm, Patient Position: Sitting, Cuff Size: Adult Regular)   Pulse 70   Ht 5' 1.5\" (1.562 m)   Wt 118 lb (53.5 kg)   SpO2 97%   BMI 21.93 kg/m           VisionScreening:  No exam data present     PHYSICAL EXAM  EYES: Eyelids, conjunctiva, and sclera were normal. Pupils were normal. Cornea, iris, and lens were normal bilaterally.  HEAD, EARS, NOSE, MOUTH, AND THROAT: Head and face were normal. Hearing was normal to voice and the ears were normal to external exam. Nose appearance was normal and there was no discharge. Oropharynx was normal.  TMs were normal.  NECK: Neck appearance was normal. There were no neck masses and the thyroid was not enlarged.  RESPIRATORY: Breathing pattern was normal and the chest moved symmetrically.   Lung sounds were equal bilaterally.  CARDIOVASCULAR: Heart rate and rhythm were normal.  S1 and S2 were normal and there were no extra sounds or murmurs. Peripheral pulses in arms and legs were normal.  Jugular venous pressure was normal.  There was no peripheral edema.  GASTROINTESTINAL: The abdomen was normal in contour.  Bowel sounds were present.   Palpation detected no tenderness, mass, or enlarged organs.   MUSCULOSKELETAL: Skeletal configuration was normal and muscle mass was normal for age. Joint appearance was overall " normal.  LYMPHATIC: There were no enlarged nodes.  SKIN/HAIR/NAILS: Skin color was normal.  There were no abnormal skin lesions.  Hair and nails were normal.  NEUROLOGIC: The patient was alert and oriented to person, place, time, and circumstance. Speech was normal. Cranial nerves were normal. Motor strength was normal for age. The patient was normally coordinated.  PSYCHIATRIC:  Mood and affect were normal and the patient had normal recent and remote memory. The patient's judgment and insight were normal.  BREASTS: Negative for masses, nipple discharge or axillary adenopathy        Assessment Results 9/2/2020   Activities of Daily Living No help needed   Instrumental Activities of Daily Living No help needed   Mini Cog Total Score 5   Some recent data might be hidden     A Mini-Cog score of 0-2 suggests the possibility of dementia, score of 3-5 suggests no dementia        Identified Health Risks:     She is at risk for lack of exercise and has been provided with information to increase physical activity for the benefit of her well-being.  The patient was counseled and encouraged to consider modifying their diet and eating habits. She was provided with information on recommended healthy diet options.  The patient was provided with written information regarding signs of hearing loss.  Information on urinary incontinence and treatment options given to patient.  Patient's advanced directive was discussed and I am comfortable with the patient's wishes.

## 2021-06-11 NOTE — TELEPHONE ENCOUNTER
Please contact Mrs. Acosta and let her know that she has multiple kidney stones in her kidneys.  I had already put in a referral for urology.  Does she have that appointment made yet?  It will be important to follow through and have them take a look at her CT scan.

## 2021-06-12 NOTE — TELEPHONE ENCOUNTER
Patient requesting phone call via [employer] ED Loop. Patient complains of mild abdominal pressure. She had a MRI of her head and wonders if this will give her information about whether abdominal pressure could be caused by a problem with her uterus. Advised head MRI will not give information for uterine complication. She was seen in ED yesterday due to severe lightheadedness. She did a telehealth visit and was treated with cipro for UTI. She submitted a urine specimen and was cleared for UTI and stopped cipro as she was told this could be a cause of her lightheadedness. She had elevated blood pressure in ED which her son, who is a physician, said could be contributing to her symptoms. She has a known history of 11 kidney stones and has urology follow up appointment scheduled for Wednesday. She would like to speak with her primary care provider at [facility] but notes that her provider is out of the office currently. She is frustrated with [employer] process but accepts help number for [facility] to see if she can contact her provider to schedule a telehealth appointment. She has no further concerns at this time. LANG sent to PCP. Thank you.

## 2021-06-12 NOTE — PATIENT INSTRUCTIONS - HE
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     I transmitted prescription for Cipro 500 mg twice a day for 10 days.     She has appointment to see urology next week, and her symptoms are bothersome enough that she needs to get treated now.

## 2021-06-12 NOTE — TELEPHONE ENCOUNTER
Controlled Substance Refill Request  Medication Name:   Requested Prescriptions     Pending Prescriptions Disp Refills     pseudoephedrine-guaifenesin (MUCINEX D)  mg per tablet 90 tablet 0     Sig: Take 1 tablet by mouth every 12 (twelve) hours.     Date Last Fill: 9/2/20  [facility]ed [facility]  Submit electronically to pharmacy  Controlled Substance Agreement on file:   Encounter-Level CSA Scan Date:    There are no encounter-level csa scan date.        Last office visit:  9/2/20  Hilary Ochoa RN, MA  [facility]    Triage Nurse Advisor

## 2021-06-12 NOTE — TELEPHONE ENCOUNTER
Called pt and the soonest appt that I can get for [place] or [place] is Wednesday and pt said she would rather keep her appt in [facility] at 9am on Thursday.  No sooner appointments.    She also wanted Dr Garcia to know hat she does have an appt with [facility] in person on Wednesday.  I advised pt to call to ck if exposure is ok with [facility] and she said she already did ck and it was fine asa long as she doesn't have any symptoms.

## 2021-06-12 NOTE — TELEPHONE ENCOUNTER
On cipro for bladder infection. Started it yesterday, dizziness, blurred vision, light headed. Ended up with two prescriptions. Also for macrobid, 500 mg cipro. Can't handle the cipro.  I did a triage of symptoms and she will have someone bring her to the ER for evaluation of dizziness and blurred vision.  Gaby Daniel RN  [facility]      Additional Information    Negative: Shock suspected (e.g., cold/pale/clammy skin, too weak to stand, low BP, rapid pulse)    Negative: Difficult to awaken or acting confused (e.g., disoriented, slurred speech)    Negative: Fainted, and still feels dizzy afterwards    Negative: SEVERE difficulty breathing (e.g., struggling for each breath, speaks in single words)    Negative: Overdose (accidental or intentional) of medications    Negative: New neurologic deficit that is present now: * Weakness of the face, arm, or leg on one side of the body * Numbness of the face, arm, or leg on one side of the body * Loss of speech or garbled speech    Negative: Heart beating < 50 beats per minute OR > 140 beats per minute    Negative: Sounds like a life-threatening emergency to the triager    Negative: Chest pain    Negative: Rectal bleeding, bloody stool, or tarry-black stool    Negative: Vomiting is the main symptom    Negative: Diarrhea is the main symptom    Negative: Headache is the main symptom    Negative: Heat exhaustion suspected (i.e., dehydration from heat exposure)    Negative: Patient states that he/she is having an anxiety/panic attack    Negative: SEVERE dizziness (e.g., unable to stand, requires support to walk, feels like passing out now)    Negative: SEVERE headache or neck pain    Negative: Spinning or tilting sensation (vertigo) present now and one or more stroke risk factors (i.e., hypertension, diabetes, prior stroke/TIA, heart attack, age over 60) (Exception: prior physician evaluation for this AND no different/worse than usual)    Loss of vision or double  vision    Protocols used: DIZZINESS-A-OH

## 2021-06-12 NOTE — TELEPHONE ENCOUNTER
"RN Triage:    Urgency, frequency, painful urination, pressure in bladder area, unusual odor to urine x 2 d.  \"Symptoms kept me awake last night\".  Afebrile.  Denies back pain.  Requesting antibiotic.  Telephone visit scheduled for today.    Shawanda Ndiaye RN  [facility] Nurse Advisor    Reason for Disposition    Age > 50 years    Additional Information    Negative: Shock suspected (e.g., cold/pale/clammy skin, too weak to stand, low BP, rapid pulse)    Negative: Sounds like a life-threatening emergency to the triager    [1] Discomfort (pain, burning or stinging) when passing urine AND [2] female    Negative: Shock suspected (e.g., cold/pale/clammy skin, too weak to stand, low BP, rapid pulse)    Negative: Sounds like a life-threatening emergency to the triager    Negative: Followed a genital area injury    Negative: Taking antibiotic for urinary tract infection (UTI)    Negative: Pregnant    Negative: Postpartum (from 0 to 6 weeks after delivery)    Negative: [1] Unable to urinate (or only a few drops) > 4 hours AND [2] bladder feels very full (e.g., palpable bladder or strong urge to urinate)    Negative: Vomiting    Negative: Patient sounds very sick or weak to the triager    Negative: [1] SEVERE pain with urination  (e.g., excruciating) AND [2] not improved after 2 hours of pain medicine and Sitz bath    Negative: Fever > 100.5 F (38.1 C)    Negative: Side (flank) or lower back pain present    Negative: Diabetes mellitus or weak immune system (e.g., HIV positive, cancer chemo, splenectomy, organ transplant, chronic steroids)    Negative: Bedridden (e.g., nursing home patient, CVA, chronic illness, recovering from surgery)    Negative: Artificial heart valve or artificial joint    Negative: Unusual vaginal discharge (e.g., bad smelling, yellow, green, or foamy-white)    Negative: Patient is worried about sexually transmitted disease (STD)    Negative: Possibility of pregnancy    Negative: Blood in " urine (red, pink, or tea-colored)    Protocols used: URINATION PAIN - FEMALE-A-AH, URINARY SYMPTOMS-A-AH

## 2021-06-12 NOTE — TELEPHONE ENCOUNTER
Called patient to schedule for a f/u.  Patient states that she has an appointment with another urologist and will see them.  Laura Love RN

## 2021-06-12 NOTE — TELEPHONE ENCOUNTER
Needs attention- feeling very frustrated with the system and modes of communication.  Daughter in law with covid- was at her house wearing a mask last week.  Is passing kidney stones, so may need procedure-  Would like covid test asap

## 2021-06-13 NOTE — TELEPHONE ENCOUNTER
Dr. Garcia    She said that she is having pain in her lower bladder because her bladder isn't emptying all the way.  She has pain after she urinates.  She said that she just finished her seven days of macrobid.  She said that she doesn't think has a bladder infection.  She said that she will  her prescription for potassium citrate today.  She is also wondering if she should keep her appt with Dr. Emerson on Monday.  She said that he is a surgeon and she isn't going to have surgery.

## 2021-06-13 NOTE — TELEPHONE ENCOUNTER
Notified patient of Dr. Garcia message and she will keep her appt with Dr. Emerson on Monday and see Dr. Garcia on Tuesday.

## 2021-06-13 NOTE — TELEPHONE ENCOUNTER
Dr. Garcia    I called and spoke with patient and she said that she talked to Jer CRAVEN and she was supposed to have a telephone visit with you this morning for a bladder infection.  She never got scheduled for an appt.  Can you work her into your schedule for a telephone visit?

## 2021-06-13 NOTE — TELEPHONE ENCOUNTER
She should definitely keep her appointment with Dr. Emerson.  Hopefully she has been added onto my schedule for a phone visit on Tuesday.

## 2021-06-13 NOTE — PROGRESS NOTES
[facility] Follow Up Note    Assessment/Plan:  1. UTI (urinary tract infection)  Intermittent symptoms of dysuria/discomfort toward the end of urination.  Has had evaluation of the urologic anatomy.  No abnormalities.  Apparently, cystoscopy negative.  Some question of urinary retention.  Some question of atrophic vaginitis.  Recommendations: Urine culture is pending today.  Will give patient a prescription for Macrobid to have on hand in the event that she needs to self treat.  Recommend Pyridium for discomfort.  Recommend increased fluids and avoidance of constipation.    If these symptoms persist, may need to seek a second urologic opinion versus gynecologic opinion.    - Urinalysis-UC if Indicated  - Culture, Urine  - nitrofurantoin, macrocrystal-monohydrate, (MACROBID) 100 MG capsule; Take 1 capsule (100 mg total) by mouth 2 (two) times a day for 5 days.  Dispense: 10 capsule; Refill: 0    - phenazopyridine (PYRIDIUM) 100 MG tablet; Take 1 tablet (100 mg total) by mouth 3 (three) times a day as needed for pain.  Dispense: 20 tablet; Refill: 0    2. Renal lithiasis  Referral for second opinion at the stone clinic through [facility].  She was dissatisfied with her [place] [facility], consult.  We did proceed with a 24-hour urine and placed her on potassium citrate.  Additionally, she is working to increase her urine volume.  She has a list of foods to avoid as well.    Because of the stone burden, would like a second opinion on whether she needs any consideration for surgery.    3. Pelvic pressure in female  Persistent pelvic pressure in a female.  Of note, she has had a CT scan of the abdomen pelvis, ultrasound of the pelvic organs, ultrasound of the bladder and check on postvoid residual.  There is some question of urinary retention.  Pelvic exam-according to Dr. Emerson-urology-reveal some spasticity.  She has been referred to a pelvic .  We will follow-up after that evaluation.   Again, if no improvement, consideration for either a gynecologic or another urologic opinion.    Reassurance was provided today that she does not have ovarian or uterine cancer.  CT scan was negative.  She has no gastrointestinal complaints    4. Low bone mass with elevated FRAX (OSTEOPOROSIS-equivalent)  Discussion was had today with regard to high risk bone density.  Initially, we will proceed with optimization of calcium and vitamin D.  She will be taking calcium citrate twice daily and optimizing her vitamin D.  Of asked her to begin a neel chi or balance and weightbearing exercise class.  She is adamant about not having a bisphosphonate.  She states that she was on Fosamax at one point and had severe reflux and an esophageal complication as a result.  We did discuss the possibility of Prolia.    (Patient was educated on safety of Prolia utilizing Patient Counseling Chart for Healthcare Providers, as outlined by the Prolia REMS progam.)    Recommendations: Calcium, vitamin D and weightbearing exercise as described above.  We will proceed with prior authorization for Prolia based on high risk bone density.  Follow-up for further discussion in January.      5. Elevated BP without diagnosis of hypertension  Continue to monitor.  She was very irritated regarding recent medical care and frustration with communication/healthcare systems, etc.      Follow up in about 6 weeks    Wendy Garcia MD    Chief Complaint:  Chief Complaint   Patient presents with     Follow-up     Medication Management       History of Present Illness:  Shelbie is a 80 y.o. female who is here today for follow-up with regard to multiple issues.  Of note, she is the  of Dr. Acosta who founded the [facility]-which has now been discontinued.    Today, we proceed with follow-up with regard to urologic issues.  First, she has renal lithiasis.  She was seen by [facility]-Dr. Shahab Emerson.  Those notes are reviewed  with her.  Imaging to date has included CT scan of the abdomen pelvis, kidney ultrasound, etc.  After last visit, we ordered a stone evaluation-in the way of a 24-hour urine.  Based on the results, she was subsequently placed on potassium citrate -he is tolerating well.  Additionally, she is working hard to keep her urine volume up.  She is not satisfied with her urologic opinion with regard to whether she needs surgery for her stones.  We are going to get another opinion.    Additionally, she continues to experience some pelvic pressure and discomfort at the end of urination.  She has a normal bladder and normal cystoscopy per urology notes.  Pelvic examination by urology revealed spasticity of the pelvic wall.  She has had intermittent or mixed results with regard to urine cultures and one is currently pending.  She last had symptoms of dysuria toward the end of October and was given a prescription for Cipro floxacillin.  The symptoms did not dramatically improved.  She took Macrobid instead and felt better.  Her bladder studies showed mild urinary retention-but otherwise unremarkable.  As above, some question of pelvic floor spasticity and tightness.  She is referred to pelvic floor physical therapist through urology.  No mention was made of vaginal estrogen-but this may be a consideration.    Additional discussion was had today with regard to high risk bone densitometry.  I have reviewed the scan with her.  We have discussed treatment and potential options.  She is very interested in treatment.  Her son who is a family physician has run through some of the options with her as well.    Review of Systems:  A comprehensive review of systems was performed and was otherwise negative    PFSH:  Social History: Is .  She has many friends in medicine.  Her son is a family physician.  She is a non-smoker.  Social History     Tobacco Use   Smoking Status Never Smoker   Smokeless Tobacco Never Used       Past  History: No past medical history on file.    Current Outpatient Medications   Medication Sig Dispense Refill     celecoxib (CELEBREX) 100 MG capsule Take 100 mg by mouth 2 (two) times a day.       meclizine (ANTIVERT) 25 mg tablet Take 1 tablet (25 mg total) by mouth 4 (four) times a day as needed for dizziness. 28 tablet 0     omega-3/dha/epa/dpa/fish oil (OMEGA-3 2100 ORAL) Take by mouth.       polyethylene glycol (MIRALAX) 17 gram packet Take 17 g by mouth daily.       potassium citrate (UROCIT-K 15) 15 mEq TbER Take 1 tablet by mouth 3 (three) times a day. 180 tablet 4     pseudoephedrine-guaifenesin (MUCINEX D)  mg per tablet Take 1 tablet by mouth every 12 (twelve) hours. 90 tablet 0     rosuvastatin (CRESTOR) 5 MG tablet Take 1 tablet (5 mg total) by mouth daily. 90 tablet 0     timoloL maleate (TIMOPTIC) 0.5 % ophthalmic solution 1 drop 2 (two) times a day.       UNABLE TO FIND Med Name: multi collagen joint mobility       montelukast (SINGULAIR) 10 mg tablet Take 1 tablet (10 mg total) by mouth daily. 90 tablet 3     nitrofurantoin, macrocrystal-monohydrate, (MACROBID) 100 MG capsule Take 1 capsule (100 mg total) by mouth 2 (two) times a day for 5 days. 10 capsule 0     phenazopyridine (PYRIDIUM) 100 MG tablet Take 1 tablet (100 mg total) by mouth 3 (three) times a day as needed for pain. 20 tablet 0     No current facility-administered medications for this visit.        Family History:     Physical Exam:  General Appearance:   She is neatly groomed and very pleasant and well-appearing and in no acute distress.  She is full of good humor today  Vitals:    12/15/20 1241   BP: 158/74   Patient Site: Left Arm   Patient Position: Sitting   Cuff Size: Adult Regular   Pulse: 72   Temp: 97  F (36.1  C)   TempSrc: Tympanic   SpO2: 98%   Weight: 121 lb (54.9 kg)     Wt Readings from Last 3 Encounters:   12/15/20 121 lb (54.9 kg)   11/24/20 121 lb (54.9 kg)   10/30/20 116 lb (52.6 kg)     Body mass index is  22.13 kg/m .    No exam    Data Review:    Analysis and Summary of Old Records (2): Reviewed urology records    Records Requested (1):       Other History Summarized (from other people in the room) (2): \    Radiology Tests Summarized (XRAY/CT/MRI/DXA) (1): Reviewed bone density with her    Labs Reviewed (1): Reviewed labs from 24-hour urine-ordered in this office    Medicine Tests Reviewed (EKG/ECHO/COLONOSCOPY/EGD) (1):     Independent Review of EKG or X-RAY (2):

## 2021-06-13 NOTE — TELEPHONE ENCOUNTER
Please see other message.  I talked to patient this morning and I already sent a message to Dr. Garcia to see if we can get her in for a phone visit today.

## 2021-06-13 NOTE — TELEPHONE ENCOUNTER
----- Message from Wendy Garcia MD sent at 12/15/2020  1:37 PM CST -----  Looking for prior authorization for Prolia. Diagnosis is low bone density with elevated FRAX equals osteoporosis equivalent. Esophageal stricture with bisphosphonate therapy-poorly tolerated.

## 2021-06-13 NOTE — TELEPHONE ENCOUNTER
Attempted to contact pt to go over pcp message and [employer] message.  VM full, unable to leave message.  Will try again later.  If pt calls, please go over pcp messages.

## 2021-06-13 NOTE — TELEPHONE ENCOUNTER
SPOKE WITH [facility] - PATIENT IS SCHEDULED FOR 12/14/2020 AT 9:55 A.M. WITH DR RAMIREZ IN [place] - PATIENT NOTIFIED AND AGREES

## 2021-06-13 NOTE — TELEPHONE ENCOUNTER
THIS PATIENT WAS ALREADY SCHEDULED FOR [facility] DR RAMIREZ ON 11/16/2020 AT 2:20 P.M.

## 2021-06-13 NOTE — PROGRESS NOTES
[facility] Follow Up Note    Assessment/Plan:  1. Pelvic pressure in female  Ongoing symptoms of pelvic pressure with CT scan revealing multiple renal calculi.  Intermittent symptoms of UTI/etc.  Poor communication.  Patient has seen urology and those notes are reviewed.  He does not believe that her pelvic pressure is from her stones.  Wants to treatment conservatively currently with pandemic-so no surgery scheduled currently.  She is feeling frustrated.  Moving bowels normally.  Worried about female organs.  Recommendation: We will do further evaluation to make sure pelvic pressure is not secondary to a urine abnormality or prolapse.  Diagnostics as below.  Follow-up when tests are complete  - US Bladder; Future  - US Pelvis Non OB; Future  - Urinalysis-UC if Indicated    2. Calculus of kidney  Multiple renal calculi.  We will proceed with stone work-up.  Labs as below  - Creatinine, 24 Hour Urine  - Stone Formation, 24 Hour Urine  - Comprehensive Metabolic Panel  - HM2(CBC w/o Differential)  - Parathyroid Hormone Intact    3. Elevated BP without diagnosis of hypertension  Home blood pressures in the low 1 teens.  She is very anxious driving downtown.          Wendy Garcia MD    Chief Complaint:  Chief Complaint   Patient presents with     Follow-up       History of Present Illness:  Shelbie is a 80 y.o. female who is here today for follow-up of pelvic pressure.  Of note, she was eventually introduced to me in the fall of this year.  She is the wife of the Alan Acosta who found [facility] that is now being dismantled.  She is feeling very frustrated with that.    She does report that she has had pelvic pressure dating back to October.  The notes from Dr. Plunkett are reviewed.  She was presumed to have a UTI given abnormal urine analysis.  She was given 2 separate prescriptions for antibiotics.  This was frustrating for her.  Of note, when seen in September, she had nocturia and a  question of urinary leakage.  A CT scan was done at that time that showed multiple renal stones.  Urology follow-up was arranged.  Those notes are reviewed.    She does continue to have very low pelvic pressure.  Is worse when she stands up.  She states her bowels are working regularly.  She is on a very extensive bowel regimen and this is working for her.  She has not had any weight loss with it.  She does not have any pain specifically with eating.    Images in the chart are reviewed    Review of Systems:  A comprehensive review of systems was performed and was otherwise negative    PFSH:  Social History: She is .  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  They continue to be actively involved.  Social History     Tobacco Use   Smoking Status Never Smoker   Smokeless Tobacco Never Used       Past History:   Current Outpatient Medications   Medication Sig Dispense Refill     celecoxib (CELEBREX) 100 MG capsule Take 100 mg by mouth 2 (two) times a day.       meclizine (ANTIVERT) 25 mg tablet Take 1 tablet (25 mg total) by mouth 4 (four) times a day as needed for dizziness. 28 tablet 0     omega-3/dha/epa/dpa/fish oil (OMEGA-3 2100 ORAL) Take by mouth.       polyethylene glycol (MIRALAX) 17 gram packet Take 17 g by mouth daily.       pseudoephedrine-guaifenesin (MUCINEX D)  mg per tablet Take 1 tablet by mouth every 12 (twelve) hours. 90 tablet 0     rosuvastatin (CRESTOR) 5 MG tablet Take 1 tablet (5 mg total) by mouth daily. 90 tablet 0     Saccharomyces boulardii (FLORASTOR) 250 mg capsule Take 250 mg by mouth 2 (two) times a day.       timoloL maleate (TIMOPTIC) 0.5 % ophthalmic solution 1 drop 2 (two) times a day.       UNABLE TO FIND Med Name: multi collagen joint mobility       montelukast (SINGULAIR) 10 mg tablet Take 1 tablet (10 mg total) by mouth daily. 90 tablet 3     No current facility-administered medications for this visit.        Family History:     Physical  "Exam:  General Appearance:   She appears well and in no acute distress.  No weight loss is noted.  Her blood pressure is up but she is feeling frustrated  Vitals:    11/24/20 0815 11/24/20 0821   BP: 156/76 150/74   Patient Site: Right Arm    Patient Position: Sitting    Cuff Size: Adult Regular    Pulse: 74    SpO2: 99%    Weight: 121 lb (54.9 kg)    Height: 5' 2\" (1.575 m)      Wt Readings from Last 3 Encounters:   11/24/20 121 lb (54.9 kg)   10/30/20 116 lb (52.6 kg)   09/02/20 118 lb (53.5 kg)     Body mass index is 22.13 kg/m .    Abdominal exam is negative    Data Review:    Analysis and Summary of Old Records (2): Reviewed urology notes in my chart notes    Records Requested (1):       Other History Summarized (from other people in the room) (2):     Radiology Tests Summarized (XRAY/CT/MRI/DXA) (1): Reviewed CT scan    Labs Reviewed (1): Ordered labs    Medicine Tests Reviewed (EKG/ECHO/COLONOSCOPY/EGD) (1):     Independent Review of EKG or X-RAY (2):         "

## 2021-06-13 NOTE — TELEPHONE ENCOUNTER
New Appointment Needed  What is the reason for the visit:    Patient wants to do a telephone appointment with Dr. Garcia.  Patient was told that she had a telephone appointment today with  bu she is not scheduled.  Patient states she is dealing with some pain and needs a diagnosis.  Provider Preference: PCP only  How soon do you need to be seen?: today  Waitlist offered?: No  Okay to leave a detailed message:  Yes

## 2021-06-13 NOTE — TELEPHONE ENCOUNTER
Triage call:   Patient calling to figure out why her appointment with Dr Garcia was canceled this morning. Reviewed chart and patient was supposed to be scheduled this morning per chart review at 7 am. No appointment. Appointment was to review her test results and why she has been having bladder pain. Patient has also sent multiple [employer] messages. Patient is requesting a call back.     Declines triage- wants to speak to her MD.     Attempted to get her connected with scheduling for an appointment but line was dead when trying to bring her over to speak with scheduling. Called patient back and let her know that a message will be routed to PCP try to get her scheduled today.     Ange Bragg RN BSBA Care Connection Triage/Med Refill 12/11/2020 7:42 AM    Additional Information    Negative: Sounds like a life-threatening emergency to the triager    Negative: Information only call about a Well Adult (no illness or injury)    Negative: Nursing judgment    Negative: Nursing judgment    Negative: Nursing judgment    Nursing judgment    Protocols used: NO PROTOCOL AVAILABLE - SICK ADULT-A-OH

## 2021-06-13 NOTE — TELEPHONE ENCOUNTER
Lets call and get a detailed history with regard to her current symptoms.    I note also if she has a follow-up with urology scheduled.  Find out if she got my message with regard to the potassium citrate.  Lets try to get her on for a follow-up visit on Tuesday if there is an opening-you same day if needed.    Lets try to get her current symptoms handled for the weekend

## 2021-06-14 NOTE — PROGRESS NOTES
Chief complaint: Here to establish care    History of present illness:   Shelbie comes in today to establish care with me.  Her  sees Dr. Palmer Stephenson and he recommended her to me.  She has been under an increased amount of stress due to caregiving stress for her  who has Parkinson's disease and is having some psychotic delusions accompanying this.  He has some people coming into their home but she is still his primary caregiver.  She also worries about her heart.  She had been seeing Dr. Jackie Pierce but felt her last physical was not done appropriately and was done by a PA.  She has a lot of problems with sinus drainage and congestion and wonders about trying something else.  She did try Flonase but this made her nosebleed.  She uses a saline wash with some success.    Social history:   Social History     Social History Narrative    She is  () and her  has delusional dementia and parkinson's disease and started the [facility] at .  They have 3 sons, 6 grandchildren.  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     Current medical problems:    Patient Active Problem List   Diagnosis     Disorder of lipoid metabolism     Inflammatory osteoarthritis     Osteopenia     Postherpetic neuralgia     Vasomotor rhinitis       Past surgical history:  Past Surgical History:   Procedure Laterality Date     CARPAL TUNNEL RELEASE       FACIAL COSMETIC SURGERY       REDUCTION MAMMAPLASTY         Family history:    Family History   Problem Relation Age of Onset     Heart disease Mother       age 78 of MI     Heart disease Father      Prostate cancer Father       age 86     Mental illness Sister      Heart disease Brother        Review of systems: Please see above the rest of the review of systems are negative for all systems.    Current allergies, medications, and past medical history are all reviewed and updated.    Exam  General Appearance:   Pleasant and  "alert  Vitals:    11/20/17 1315   BP: 130/68   Patient Site: Left Arm   Patient Position: Sitting   Cuff Size: Adult Regular   Pulse: 66   Weight: 123 lb 14.4 oz (56.2 kg)   Height: 5' 2\" (1.575 m)     Body mass index is 22.66 kg/(m^2).  HEENT: Sclera are clear and nonicteric  Lungs: Respirations are normal  Abdomen: Nontender nondistended  Extremities: No edema, cyanosis, or clubbing  Skin: No rashes  Neuro: Moves all extremities and there is no facial asymmetry    Assessment and plan:  1. Chronic vasomotor rhinitis  We will try her on some Singulair.  Could always add Zyrtec to this as well.    2. Adjustment disorder with depressed mood  She agrees to trying escitalopram 5 mg daily.    3. Menopause  - DXA Bone Density Scan; Future    4.  Hyperlipidemia and inability to tolerate statins  We discussed doing coronary heart scan see if we would need to try to push the statins.      Total time spent with this patient was 45 minutes and over 50% of this was in regard to establishing care and going over previous medical history.    Esther Smith MD  Internal and Geriatric Medicine  [facility]  11/20/2017    [facility]    Much or all of the text in this note was generated through the use of Dragon Dictate voice-to-text software. Errors in spelling or words which seem out of context are unintentional. Sound alike errors, in particular, may have escaped editing.            "

## 2021-06-14 NOTE — PROGRESS NOTES
The following steps were completed to comply with the REMS program for Prolia:  1. Ordering provider has previously reviewed information in the Medication Guide and Patient Counseling Chart, including the serious risks of Prolia  and the symptoms of each risk and have been advised to seek prompt medical attention if they have signs or symptoms of any of the serious risks.  2. Provided each patient a copy of the Medication Guide and Patient Brochure.  See MAR for administration details.   Indication: Prolia  (denosumab) is a prescription medicine used to treat osteoporosis in patients who:   Are at high risk for fracture, meaning patients who have had a fracture related to osteoporosis, or who have multiple risk factors for fracture; Cannot use another osteoporosis medicine or other osteoporosis medicines did not work well.   The timeline for early/late injections would be 4 weeks early and any time after the 6 month hiren. If a patient receives their injection late, then the subsequent injection would be 6 months from the date that they actually received the injection    Have the screening questions been asked prior to this administration? Yes

## 2021-06-14 NOTE — TELEPHONE ENCOUNTER
New Appointment Needed  What is the reason for the visit:    Pre-Op Appt Request  When is the surgery? :  1/13  Where is the surgery?:   [facility]   Who is the surgeon? :  Dr. Hinkle  What type of surgery is being done?: N/A  Provider Preference: PCP only  How soon do you need to be seen?: as soon as able. Please call pt to go over options.  Waitlist offered?: No  Okay to leave a detailed message:  Yes

## 2021-06-14 NOTE — PATIENT INSTRUCTIONS - HE
Patient Stated Goal: Know what to expect after surgery  Ureteroscopy    Ureteroscopy is a procedure which is done for clearance of stones from the ureter, kidney or both. There are no incisions involved. The procedure involves your surgeon placing a small scope into your urethra. This is the opening where urine leaves your body.  The surgeon watches as they carefully guide the scope to the stone(s).  Modern flexible ureteroscopes can be used to reach virtually any location within the urinary tract.     The size, shape and location of the stone determines how best to treat the stone(s).  Whenever possible, stones are removed in one piece.  Larger stones need to be broken using a laser before removing in smaller pieces.  The goal is to remove all stones and stone fragments from that side of the body in a single treatment.  Complete stone clearance is an important step to prevent future kidney stone episodes.    Surgery:    Same day outpatient procedure    30-60 minutes    Procedure done in hospital surgical suite    General anesthesia (you will be asleep during the procedure)     Antibiotic prior to surgery to prevent infection    Physician will visit with you and respond to any questions or concerns and consent will be signed prior to going to the operating room    Risks:    Infection - Preoperative antibiotics should prevent new infections but it is possible that unanticipated bacteria may be introduced at time of surgery or that the stones were actually chronically infected before surgery      Injury - The ureter may be injured during this procedure.  This is most likely to happen if the ureter was very inflamed before surgery or if a stone is very tightly impacted.  The surgeon will not aggressively treat a stone if this creates a risk of injury.        Inaccessible Stones -A single procedure is effective in 95% of cases, but if your ureter is very narrow or your kidney stone is very impacted, a stent will be  placed and the procedure stopped.  In 1-2 weeks after the ureter has relaxed, the patient will be brought back to surgery and the procedure can be safely performed.      Incomplete stone clearance -Occasionally stone or stone fragments may not be completely cleared.  These may pass on their own, which may cause discomfort.  Our goal is to remove all possible stones and fragments.    Stent:      An internal soft tube will be placed between the kidney and the bladder while in surgery (after the stone is cleared). The stent will keep the kidney draining.    What should I expect?     It is common for a stent to cause some irritation and discomfort.   You may have:      The need to urinate suddenly     The need to urinate often     Pain during urination     A dull backache, which may get worse during urination     Blood stained urine (like fruit punch) and occasional small clots    It s important to remember the stent is necessary and only temporary. To feel more comfortable:      Drink more than you normally would but you do not have to constantly  flush your kidneys     Limiting your activity may decrease irritation or bleeding    Ibuprofen - 2 tablets every 6-8 hours     Use pain medications as directed.    When is the stent removed?    Most stents are removed within 5 days to 2 weeks after a procedure.     How is the stent removed?     Your stent will be removed in [facility] with a small telescope and a grasping tool.  It usually takes less than 1 minute to remove the stent.    What should I expect after the stent is removed?     You should feel normal by the next day    Some patients find:    An increase in back pain about an hour after the stent is removed as the kidney fills up with urine before it starts to empty.  It can be as uncomfortable as your initial stone episode.  Taking pain medications before stent removal may be helpful, but you would need someone else to drive you to and from your  appointment.    Bladder symptoms usually disappear by the next morning.    Small amounts of blood in the urine may be seen occasionally for up to a week.    Diet:      After surgery, there are no dietary restrictions - Drink to thirst, there is no need to increase intake of fluids, as this may increase nausea symptoms. Try to eat smaller, more frequent snacks, instead of large meals.    Activity:    Many people return to work within 1-2 days. Fatigue is normal for a couple of weeks following surgery. With increased activity you may experience more discomfort and you may notice more blood in your urine.      Post-Operative Symptom Control    While you recover from your procedure, you can take steps to ease your recovery.    Medications that prevent further episodes of severe pain and help stones pass: Take these as prescribed on a regular basis even if you are NOT in pain      Ibuprofen (Advil or Motrin) - Is available over the counter Take 2 (200mg) tablets every 6 hours until the stone passes.  o prevents spasm of the ureter.    o Decreases pain      Dramamine - (drowsy version, non-generic formulation) Is available over the counter and decreases spasm of the ureter.  Take 50mg at bedtime every night until the stone passes. In addition, take every 6 hours as needed.  Dramamine:  o Decreases nausea  o Decreases acute pain  o Decreases recurrence of pain for next 24 hours  o Will help you sleep        *This medication will cause increased drowsiness, do not drive or operate machinery for 6 hours      Flomax- Studies show that Flomax decreases irritation from stents.   o Take every day with food until stone passes even if you do not have pain  o Flomax does not relieve pain.        *This medication may cause nasal congestion or light-headedness      Detrol ( Tolterodine) - After surgery Detrol may decrease stent irritation and pelvic pain  o Take as prescribed     *This medication may cause dry mouth, constipation or  blurry vision. Stop medication if unable to urinate.    Medication that are taken as needed to manage break through symptoms: Take these ONLY as required and hopefully not at all      Narcotics (Percocet, Vicodin, Dilaudid)- take as prescribed for severe pain unrelieved by ibuprofen and dramamine  o Take as prescribed for severe pain  o Narcotics have significant side effects and only  cover-up  pain. They have no effect on cause of pain.  o Common side effects:  - Confusion, disorientation and sedation - DO NOT DRIVE OR OPERATE MACHINERY WITHIN 24 HOURS  - Nausea - take Dramamine or Zofran  or Haldol to help control  - Constipation  - Sleep disturbances      Ondansetron (Zofran)-  o Take as prescribed  o Reserve for severe nausea  o May cause constipation, start over the counter Miralax if needed to treat this    Haldol-  o Take as prescribed  o Reserve for severe nausea    Warning Signs/Symptoms - Please call the [facility] 24 hours a day at [phone] IMMEDIATELY if you experience any of these:    Fever greater than 100.1     Chills    Pain NOT CONTROLLED by pain medications    Heavy bleeding or large clots in urine (small clots can be normal)    Persistent nausea and/or vomiting    Post-Operative Follow up:    The stone(s) will be sent from surgery to a lab for composition analysis.  These results are usually available before a one month post-operative visit.  If you had laser treatment to break up your stone, you will usually be scheduled for a low dose CT scan prior to your one month appointment.  This scan allows your surgeon to confirm that all stone fragments were cleared at time of surgery and that there have been no complications.  These results along with possible labs and urine studies will help us develop an individualized plan to prevent new stones from forming and keep existing stones from enlarging.  This visit is usually scheduled about 1 month after your original surgery.    The  [facility] can respond to your questions or concerns 24 hours a day at [phone].

## 2021-06-14 NOTE — ANESTHESIA POSTPROCEDURE EVALUATION
Patient: Shelbie Doyle  Procedure(s):  CYSTOURETEROSCOPY, WITH RETROGRADE PYELOGRAM, HOLMIUM LASER LITHOTRIPSY OF URETERAL CALCULUS, AND STENT INSERTION (Left)  Anesthesia type: general    Patient location: PACU  Last vitals:   Vitals Value Taken Time   /82 01/13/21 1630   Temp 36.4  C (97.5  F) 01/13/21 1615   Pulse 69 01/13/21 1633   Resp 20 01/13/21 1615   SpO2 98 % 01/13/21 1633   Vitals shown include unvalidated device data.  Post vital signs: stable  Level of consciousness: awake and responds to simple questions  Post-anesthesia pain: pain controlled  Post-anesthesia nausea and vomiting: no  Pulmonary: unassisted, return to baseline  Cardiovascular: stable and blood pressure at baseline  Hydration: adequate  Anesthetic events: no    QCDR Measures:  ASA# 11 - Britni-op Cardiac Arrest: ASA11B - Patient did NOT experience unanticipated cardiac arrest  ASA# 12 - Britni-op Mortality Rate: ASA12B - Patient did NOT die  ASA# 13 - PACU Re-Intubation Rate: ASA13B - Patient did NOT require a new airway mgmt  ASA# 10 - Composite Anes Safety: ASA10A - No serious adverse event    Additional Notes:

## 2021-06-14 NOTE — TELEPHONE ENCOUNTER
"Prolia Injection Phone Screen      Screening questions have been asked 2-3 days prior to administration visit for Prolia. If any questions are answered with \"Yes,\" this phone encounter were will routed to ordering provider for further evaluation.     1.  When was the last injection?  First one    2.  Has insurance for this injection been verified?  No-pending    3.  Did you experience any new onset achiness or rashes that lasted for over a month with your previous Prolia injection?   No  First injection    4.  Do you have a fever over 101?F or a new deep cough that is unusual for you today? No    5.  Have you started any new medications in the last 6 months that you were told could affect your immune system? These may have been prescribed by oncologist, transplant, rheumatology, or dermatology.   No    6.  In the last 6 months have you have gastric bypass or parathyroid surgery?   No    7.  Do you plan dental work requiring drilling into the bone such as implants/extractions or oral surgery in the next 2-3 months?   No    8. Do you have new insurance since the last injection?  First injection    Patient informed if symptoms discussed above present prior to their administration appointment, they are to notify clinic immediately.     Olga NICK(R)            "

## 2021-06-14 NOTE — PROGRESS NOTES
[facility] Follow Up Note    Assessment/Plan:  1. Hospital discharge follow-up  Status post surgery for heavy renal stone burden.  Notes reviewed from Dr. Hinkle and kidney stone clinic.  Stent removed yesterday.  No fever or chills.  Using ibuprofen for back pain.  Some nausea.  Exam unremarkable.  Recommendations: Zofran as needed.  Labs as below.  - ondansetron (ZOFRAN-ODT) 4 MG disintegrating tablet; Take 1 tablet (4 mg total) by mouth every 8 (eight) hours as needed for nausea.  Dispense: 14 tablet; Refill: 0  - HM2(CBC w/o Differential)  - Basic Metabolic Panel    2. Renal lithiasis  Has had 24-hour urine in the past and is on potassium citrate.  Is working to increase urine volume.  Stone analysis pending and she will get additional recommendations per urology    3. Nausea  As above  - ondansetron (ZOFRAN-ODT) 4 MG disintegrating tablet; Take 1 tablet (4 mg total) by mouth every 8 (eight) hours as needed for nausea.  Dispense: 14 tablet; Refill: 0  - omeprazole (PRILOSEC) 20 MG capsule; Take 1 capsule (20 mg total) by mouth daily before breakfast.  Dispense: 30 capsule; Refill: 1    4. Raynaud's disease without gangrene  Raynaud's as she has aged.  Have discussed holding any medications for now until she is fully recovered from her surgery.  However, we will add a small dose of amlodipine to be used as needed  - amLODIPine (NORVASC) 2.5 MG tablet; Take 1 tablet (2.5 mg total) by mouth daily.  Dispense: 30 tablet; Refill: 11    5. Iron deficiency anemia, unspecified iron deficiency anemia type  Update labs  - HM2(CBC w/o Differential)    6. Gastroesophageal reflux disease with esophagitis without hemorrhage  Hiatal hernia and some reflux.  Recommendations: Avoidance of nonsteroidal anti-inflammatory medications.  Omeprazole short-term  - omeprazole (PRILOSEC) 20 MG capsule; Take 1 capsule (20 mg total) by mouth daily before breakfast.  Dispense: 30 capsule; Refill: 1      Follow-up in 2  months    Wendy Garcia MD    Chief Complaint:  Chief Complaint   Patient presents with     Follow-up       History of Present Illness:  Shelbie is a 80 y.o. female who is here today for follow-up after having surgery for heavy burden of renal lithiasis.  The notes are reviewed from Dr. Hinkle.  She is satisfied with her procedure.  They are both hoping this will help with that chronic discomfort that she had been feeling in her abdomen.    Yesterday, her stent was removed.  She states it came out without any difficulty.  She has had some residual back pain for which she is using ibuprofen.  She denies any fever or chills.  She has been pushing fluids but is experiencing some nausea.  She has not had vomiting.    She does report that her Raynaud's phenomenon has been more active recently.  She states it is difficult for her to go into a store.  She has some difficulty tolerating medications.    She reports an increase in dyspepsia.  She does admit to using ibuprofen.    Review of Systems:  A comprehensive review of systems was performed and was otherwise negative    PFSH:  Social History: Is the  of Dr. Hang Acosta-neurology  Social History     Tobacco Use   Smoking Status Never Smoker   Smokeless Tobacco Never Used       Past History: History reviewed. No pertinent past medical history.    Current Outpatient Medications   Medication Sig Dispense Refill     aspirin 81 MG EC tablet Take 81 mg by mouth daily.       calcium citrate/vitamin D3 (CALCIUM CITRATE + D ORAL) Take by mouth.       celecoxib (CELEBREX) 100 MG capsule Take 100 mg by mouth 2 (two) times a day.       denosumab 60 mg/mL Syrg Inject 60 mg under the skin once. December 30th       meclizine (ANTIVERT) 25 mg tablet Take 1 tablet (25 mg total) by mouth 4 (four) times a day as needed for dizziness. 28 tablet 0     omega-3/dha/epa/dpa/fish oil (OMEGA-3 2100 ORAL) Take by mouth.       phenazopyridine (PYRIDIUM) 100 MG tablet Take 1 tablet  (100 mg total) by mouth 3 (three) times a day as needed for pain. 20 tablet 0     polyethylene glycol (MIRALAX) 17 gram packet Take 17 g by mouth daily.       potassium citrate (UROCIT-K 15) 15 mEq TbER Take 1 tablet by mouth 3 (three) times a day. 180 tablet 4     pseudoephedrine-guaifenesin (MUCINEX D)  mg per tablet Take 1 tablet by mouth every 12 (twelve) hours. 90 tablet 0     rosuvastatin (CRESTOR) 5 MG tablet Take 1 tablet (5 mg total) by mouth daily. 90 tablet 0     timoloL maleate (TIMOPTIC) 0.5 % ophthalmic solution 1 drop 2 (two) times a day.       UNABLE TO FIND Med Name: multi collagen joint mobility       amLODIPine (NORVASC) 2.5 MG tablet Take 1 tablet (2.5 mg total) by mouth daily. 30 tablet 11     omeprazole (PRILOSEC) 20 MG capsule Take 1 capsule (20 mg total) by mouth daily before breakfast. 30 capsule 1     ondansetron (ZOFRAN-ODT) 4 MG disintegrating tablet Take 1 tablet (4 mg total) by mouth every 8 (eight) hours as needed for nausea. 14 tablet 0     No current facility-administered medications for this visit.        Family History:     Physical Exam:  General Appearance:   Peers quite well and in no acute distress  Vitals:    01/20/21 1123   BP: 128/72   Patient Site: Left Arm   Patient Position: Sitting   Cuff Size: Adult Regular   Pulse: 78   Temp: 98.3  F (36.8  C)   TempSrc: Tympanic   Weight: 121 lb 8 oz (55.1 kg)     Wt Readings from Last 3 Encounters:   01/20/21 121 lb 8 oz (55.1 kg)   01/08/21 121 lb (54.9 kg)   12/15/20 121 lb (54.9 kg)     Body mass index is 22.22 kg/m .    Vitals are stable  Abdomen is soft and nontender.    Data Review:    Analysis and Summary of Old Records (2): Reviewed Dr. Hinkle's notes    Records Requested (1):       Other History Summarized (from other people in the room) (2):     Radiology Tests Summarized (XRAY/CT/MRI/DXA) (1): Reviewed fluoroscopy and imaging    Labs Reviewed (1): Labs    Medicine Tests Reviewed (EKG/ECHO/COLONOSCOPY/EGD) (1):      Independent Review of EKG or X-RAY (2):

## 2021-06-14 NOTE — PROGRESS NOTES
"Assessment/Plan:        Diagnoses and all orders for this visit:    Calculus of kidney  -     Patient Stated Goal: Know what to expect after surgery  -     Ureteroscopy Education  -     nitrofurantoin, macrocrystal-monohydrate, (MACROBID) 100 MG capsule; Take 1 capsule (100 mg total) by mouth 2 (two) times a day for 14 days.  Dispense: 28 capsule; Refill: 0    Other orders  -     calcium citrate (CALCITRATE) 200 mg (950 mg) tablet; Take 1 tablet by mouth daily.  -     aspirin 81 MG EC tablet; Take 81 mg by mouth daily.  -     denosumab 60 mg/mL Syrg; Inject 60 mg under the skin once. December 30th  -     calcium citrate/vitamin D3 (CALCIUM CITRATE + D ORAL); Take by mouth.      Stone Management Plan  \[facility]\"" Stone Management 1/6/2021   Urinary Tract Infection Possible Infection   Renal Colic Well controlled symptoms   Renal Failure No suspicion of renal failure   Current CT date 9/23/2020   Right sided stones? Yes   R Number of ureteral stones No ureteral stones   R Number of kidney stones  4   R GSD of kidney stones 2 - 4   R Hydronephrosis None   R Stone Event No current event   R Current Plan Observe   Observe rationale Limited stone burden with good prognosis for spontaneous passage   Left sided stones? Yes   L Number of ureteral stones No ureteral stones   L Number of kidney stones  3   L GSD of kidney stones 15 - 20   L Hydronephrosis None   L Stone Event New event   Diagnosis date 9/23/2020   Initial location of primary symptomatic stone Renal   Initial GSD of primary symptomatic stone > 15   L Current Plan Clear   Clear rationale Poor prognosis             Phone call duration: 25 minutes    Shaan Hinkle MD    Subjective:        The patient has been notified of following:     \"This telephone visit will be conducted via a call between you and your physician/provider. We have found that certain health care needs can be provided without the need for a physical exam.  This service lets us provide the care you " "need with a short phone conversation. If a prescription is necessary, we can send it directly to your pharmacy.  If labs and/or imaging are needed, we can place orders so that you can have the test (s) done at a later time.    If during the course of the call the physician/provider feels a telephone visit is not appropriate, you will not be charged for this service.\"     HPI  Ms. Shelbie Doyle is a 80 y.o.  female who is being evaluated via a billable telephone visit by [facility] [place] referred by PCP for second opinion.    She has been struggling with pelvic pain for several months. Describes frequency,urgency, and hematuria which has been refractory to antibiotic therapy. She was evaluated with cystoscopy on two occasions without helpful findings. Pelvic floor therapy was recommended but is not expected to be available until the new year. Urine cultures have been negative. She was found to have a large left renal stone for which percutaneous clearance was thought to be necessary but overly invasive for this 81 yo woman. She is frustrated.    CT scan is personally reviewed and demonstrates a 17 mm left upper pole branched stone and an at least partially duplicated right kidney with a pair of 3 mm stone in the upper moiety and two 4 mm stone in the lower pole moiety. The large branched stone in the upper pole of the left kidney has a density ~1450 HU.    Significant labs from presentation are summarized below    PLAN    We had a good discussion. It is possible that the left upper pole stone could be contributing to the pelvic floor symptoms by way of chronic infection. It is obviously noted that urine cultures have been negative. The stone merits clearance simply on the basis of its dimensions. I believe it can be cleared with ureteroscopy taking advantage of high power laser energy. She should be pretreated with macrobid for a week to decrease potential infection of the " stone.     We discussed potential risks and specifically detailed that a post-operative stent would exacerbate voiding symptoms. I did not guarantee that her pain would be relieved, but it is possible and would obviously decrease issues for existing stone. She may still benefit from pelvic floor evaluation but that may be clarified after clearance of the stone.    Will try to schedule in the near future with awareness of resource limitations of current COVID crisis. Will request a pre-op evaluation from Dr Garcia.     ROS   Review of systems is negative except for HPI    No past medical history on file.    Past Surgical History:   Procedure Laterality Date     CARPAL TUNNEL RELEASE       FACIAL COSMETIC SURGERY       REDUCTION MAMMAPLASTY         Current Outpatient Medications   Medication Sig Dispense Refill     aspirin 81 MG EC tablet Take 81 mg by mouth daily.       calcium citrate (CALCITRATE) 200 mg (950 mg) tablet Take 1 tablet by mouth daily.       calcium citrate/vitamin D3 (CALCIUM CITRATE + D ORAL) Take by mouth.       denosumab 60 mg/mL Syrg Inject 60 mg under the skin once. December 30th       omega-3/dha/epa/dpa/fish oil (OMEGA-3 2100 ORAL) Take by mouth.       phenazopyridine (PYRIDIUM) 100 MG tablet Take 1 tablet (100 mg total) by mouth 3 (three) times a day as needed for pain. 20 tablet 0     potassium citrate (UROCIT-K 15) 15 mEq TbER Take 1 tablet by mouth 3 (three) times a day. 180 tablet 4     rosuvastatin (CRESTOR) 5 MG tablet Take 1 tablet (5 mg total) by mouth daily. 90 tablet 0     timoloL maleate (TIMOPTIC) 0.5 % ophthalmic solution 1 drop 2 (two) times a day.       UNABLE TO FIND Med Name: multi collagen joint mobility       celecoxib (CELEBREX) 100 MG capsule Take 100 mg by mouth 2 (two) times a day.       meclizine (ANTIVERT) 25 mg tablet Take 1 tablet (25 mg total) by mouth 4 (four) times a day as needed for dizziness. 28 tablet 0     montelukast (SINGULAIR) 10 mg tablet Take 1 tablet  (10 mg total) by mouth daily. 90 tablet 3     nitrofurantoin, macrocrystal-monohydrate, (MACROBID) 100 MG capsule Take 1 capsule (100 mg total) by mouth 2 (two) times a day for 14 days. 28 capsule 0     polyethylene glycol (MIRALAX) 17 gram packet Take 17 g by mouth daily.       pseudoephedrine-guaifenesin (MUCINEX D)  mg per tablet Take 1 tablet by mouth every 12 (twelve) hours. 90 tablet 0     No current facility-administered medications for this visit.        Allergies   Allergen Reactions     Atorvastatin Calcium Unknown     Cerivastatin Sodium Unknown     Doxycycline Unknown     Fosamax [Alendronate] Unknown     Esophagea stricture     Pravastatin Unknown     Rosuvastatin Calcium Unknown       Social History     Socioeconomic History     Marital status:      Spouse name: Not on file     Number of children: Not on file     Years of education: Not on file     Highest education level: Not on file   Occupational History     Not on file   Social Needs     Financial resource strain: Not on file     Food insecurity     Worry: Not on file     Inability: Not on file     Transportation needs     Medical: Not on file     Non-medical: Not on file   Tobacco Use     Smoking status: Never Smoker     Smokeless tobacco: Never Used   Substance and Sexual Activity     Alcohol use: Not on file     Drug use: Not on file     Sexual activity: Not on file   Lifestyle     Physical activity     Days per week: Not on file     Minutes per session: Not on file     Stress: Not on file   Relationships     Social connections     Talks on phone: Not on file     Gets together: Not on file     Attends [employer] service: Not on file     Active member of club or organization: Not on file     Attends meetings of clubs or organizations: Not on file     Relationship status: Not on file     Intimate partner violence     Fear of current or ex partner: Not on file     Emotionally abused: Not on file     Physically abused: Not on file      Forced sexual activity: Not on file   Other Topics Concern     Not on file   Social History Narrative    She is  (1951) and her  has delusional dementia and parkinson's disease and started the [facility] [facility] at .  They have 3 sons, 6 grandchildren.  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       Family History   Problem Relation Age of Onset     Heart disease Mother          age 78 of MI     Heart disease Father      Prostate cancer Father          age 86     Mental illness Sister      Heart disease Brother        Objective:      Physical Exam  There were no vitals filed for this visit.    Labs    Urinalysis POC (Office):  Nitrite, UA   Date Value Ref Range Status   12/15/2020 Negative Negative Final   2020 Negative Negative Final   10/29/2020 Negative Negative Final       Lab Urinalysis:  Blood, UA   Date Value Ref Range Status   12/15/2020 Small (!) Negative Final   2020 Trace (!) Negative Final   10/29/2020 Trace (!) Negative Final     Nitrite, UA   Date Value Ref Range Status   12/15/2020 Negative Negative Final   2020 Negative Negative Final   10/29/2020 Negative Negative Final     Leukocytes, UA   Date Value Ref Range Status   12/15/2020 Trace (!) Negative Final   2020 Negative Negative Final   10/29/2020 Negative Negative Final     pH, UA   Date Value Ref Range Status   12/15/2020 6.5 5.0 - 8.0 Final   2020 6.0 5.0 - 8.0 Final   10/29/2020 6.0 5.0 - 8.0 Final    and Acute Labs   CBC   WBC   Date Value Ref Range Status   2020 7.1 4.0 - 11.0 thou/uL Final   10/30/2020 7.3 4.0 - 11.0 thou/uL Final   2020 6.6 4.0 - 11.0 thou/uL Final     Hemoglobin   Date Value Ref Range Status   2020 11.8 (L) 12.0 - 16.0 g/dL Final   10/30/2020 11.4 (L) 12.0 - 16.0 g/dL Final   2020 13.3 12.0 - 16.0 g/dL Final     Platelets   Date Value Ref Range Status   2020 265 140 - 440 thou/uL Final   10/30/2020 272 140 - 440  thou/uL Final   09/02/2020 329 140 - 440 thou/uL Final   , Renal Panel  [facility]  Creatinine   Date Value Ref Range Status   11/24/2020 0.62 0.60 - 1.10 mg/dL Final   10/30/2020 0.70 0.60 - 1.10 mg/dL Final   09/02/2020 0.74 0.60 - 1.10 mg/dL Final     Potassium   Date Value Ref Range Status   11/24/2020 3.7 3.5 - 5.0 mmol/L Final   10/30/2020 3.7 3.5 - 5.0 mmol/L Final   09/02/2020 4.2 3.5 - 5.0 mmol/L Final     Calcium   Date Value Ref Range Status   11/24/2020 9.0 8.5 - 10.5 mg/dL Final   10/30/2020 8.7 8.5 - 10.5 mg/dL Final   09/02/2020 9.2 8.5 - 10.5 mg/dL Final    and Urine Culture    Culture   Date Value Ref Range Status   12/15/2020 No Growth  Final   09/02/2020 No Growth  Final

## 2021-06-14 NOTE — ANESTHESIA CARE TRANSFER NOTE
Last vitals:   Vitals:    01/13/21 1536   BP: 161/79   Pulse: 76   Resp: 16   Temp: 36.3  C (97.4  F)   SpO2: 99%     Patient's level of consciousness is drowsy  Spontaneous respirations: yes  Maintains airway independently: yes  Dentition unchanged: yes  Oropharynx: oropharynx clear of all foreign objects    QCDR Measures:  ASA# 20 - Surgical Safety Checklist: WHO surgical safety checklist completed prior to induction    PQRS# 430 - Adult PONV Prevention: 4558F - Pt received => 2 anti-emetic agents (different classes) preop & intraop  ASA# 8 - Peds PONV Prevention: NA - Not pediatric patient, not GA or 2 or more risk factors NOT present  PQRS# 424 - Britni-op Temp Management: 4559F - At least one body temp DOCUMENTED => 35.5C or 95.9F within required timeframe  PQRS# 426 - PACU Transfer Protocol: - Transfer of care checklist used  ASA# 14 - Acute Post-op Pain: ASA14B - Patient did NOT experience pain >= 7 out of 10

## 2021-06-14 NOTE — PROGRESS NOTES
[facility]  [address] [address]  Dept: [phone]  Dept Fax: [phone]  Primary Provider: Wendy Garcia MD  Pre-op Performing Provider: STEPHANIE BORGES    PREOPERATIVE EVALUATION:  Today's date: 1/8/2021    Shelbie Doyle is a 80 y.o. female who presents for a preoperative evaluation.    Surgical Information:  Surgery/Procedure: Kidney Stone Removal   Surgery Location: [facility]   Surgeon: Dr. Shaan Hinkle  Surgery Date: 1/13/21  Time of Surgery: TBD  Where patient plans to recover: At home with family  Fax number for surgical facility: Note does not need to be faxed, will be available electronically in Epic.    Type of Anesthesia Anticipated: to be determined    Subjective     HPI related to upcoming procedure: large kidney stone causing heamturia     Preop Questions 1/8/2021   Have you ever had a heart attack or stroke? No   Have you ever had surgery on your heart or blood vessels, such as a stent placement, a coronary artery bypass, or surgery on an artery in your head, neck, heart, or legs? No   Do you have chest pain with activity? No   Do you have a history of  heart failure? No   Do you currently have a cold, bronchitis or symptoms of other infection? No   Do you have a cough, shortness of breath, or wheezing? No   Do you or anyone in your family have previous history of blood clots? No   Do you or does anyone in your family have a serious bleeding problem such as prolonged bleeding following surgeries or cuts? No   Have you ever had problems with anemia or been told to take iron pills? No   Have you had any abnormal blood loss such as black, tarry or bloody stools, or abnormal vaginal bleeding? No   Have you ever had a blood transfusion? No   Are you willing to have a blood transfusion if it is medically needed before, during, or after your surgery? Yes   Have you or any of your relatives ever had problems with  anesthesia? No   Do you have sleep apnea, excessive snoring or daytime drowsiness? No   Do you have any artifical heart valves or other implanted medical devices like a pacemaker, defibrillator, or continuous glucose monitor? No   Do you have artificial joints? No   Are you allergic to latex? No     Health Care Directive:  Patient has a Health Care Directive on file.     Preoperative Review of :       See problem list for active medical problems.  Problems all longstanding and stable, except as noted/documented.  See ROS for pertinent symptoms related to these conditions.      Review of Systems  CONSTITUTIONAL: NEGATIVE for fever, chills, change in weight  INTEGUMENTARY/SKIN: NEGATIVE for worrisome rashes, moles or lesions  EYES: NEGATIVE for vision changes or irritation  ENT/MOUTH: NEGATIVE for ear, mouth and throat problems  RESP: NEGATIVE for significant cough or SOB  BREAST: NEGATIVE for masses, tenderness or discharge  CV: NEGATIVE for chest pain, palpitations or peripheral edema  GI: NEGATIVE for nausea, abdominal pain, heartburn, or change in bowel habits  : NEGATIVE for frequency, dysuria, or hematuria  MUSCULOSKELETAL: NEGATIVE for significant arthralgias or myalgia  NEURO: NEGATIVE for weakness, dizziness or paresthesias  ENDOCRINE: NEGATIVE for temperature intolerance, skin/hair changes  HEME: NEGATIVE for bleeding problems  PSYCHIATRIC: NEGATIVE for changes in mood or affect    Patient Active Problem List    Diagnosis Date Noted     Calculus of kidney 01/06/2021     Renal lithiasis 12/15/2020     Hiatal hernia 01/09/2018     Postherpetic neuralgia 04/16/2015     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  Past Surgical History:   Procedure Laterality Date     CARPAL TUNNEL RELEASE       FACIAL COSMETIC SURGERY       REDUCTION MAMMAPLASTY       Current Outpatient  Medications   Medication Sig Dispense Refill     aspirin 81 MG EC tablet Take 81 mg by mouth daily.       calcium citrate/vitamin D3 (CALCIUM CITRATE + D ORAL) Take by mouth.       celecoxib (CELEBREX) 100 MG capsule Take 100 mg by mouth 2 (two) times a day.       denosumab 60 mg/mL Syrg Inject 60 mg under the skin once. December 30th       meclizine (ANTIVERT) 25 mg tablet Take 1 tablet (25 mg total) by mouth 4 (four) times a day as needed for dizziness. 28 tablet 0     nitrofurantoin, macrocrystal-monohydrate, (MACROBID) 100 MG capsule Take 1 capsule (100 mg total) by mouth 2 (two) times a day for 14 days. 28 capsule 0     omega-3/dha/epa/dpa/fish oil (OMEGA-3 2100 ORAL) Take by mouth.       phenazopyridine (PYRIDIUM) 100 MG tablet Take 1 tablet (100 mg total) by mouth 3 (three) times a day as needed for pain. 20 tablet 0     polyethylene glycol (MIRALAX) 17 gram packet Take 17 g by mouth daily.       potassium citrate (UROCIT-K 15) 15 mEq TbER Take 1 tablet by mouth 3 (three) times a day. 180 tablet 4     pseudoephedrine-guaifenesin (MUCINEX D)  mg per tablet Take 1 tablet by mouth every 12 (twelve) hours. 90 tablet 0     rosuvastatin (CRESTOR) 5 MG tablet Take 1 tablet (5 mg total) by mouth daily. 90 tablet 0     timoloL maleate (TIMOPTIC) 0.5 % ophthalmic solution 1 drop 2 (two) times a day.       UNABLE TO FIND Med Name: multi collagen joint mobility       montelukast (SINGULAIR) 10 mg tablet Take 1 tablet (10 mg total) by mouth daily. 90 tablet 3     No current facility-administered medications for this visit.        Allergies   Allergen Reactions     Atorvastatin Calcium Unknown     Cerivastatin Sodium Unknown     Doxycycline Unknown     Fosamax [Alendronate] Unknown     Esophagea stricture     Pravastatin Unknown     Rosuvastatin Calcium Unknown       Social History     Tobacco Use     Smoking status: Never Smoker     Smokeless tobacco: Never Used   Substance Use Topics     Alcohol use: Not on file     "    Social History     Substance and Sexual Activity   Drug Use Not on file        Objective     /60 (Patient Site: Left Arm, Patient Position: Sitting, Cuff Size: Adult Regular)   Pulse 78   Temp 98.6  F (37  C)   Ht 5' 2\" (1.575 m)   Wt 121 lb (54.9 kg)   SpO2 98%   BMI 22.13 kg/m    Physical Exam    GENERAL APPEARANCE: healthy, alert and no distress     EYES: EOMI, PERRL     HENT: ear canals and TM's normal and nose and mouth without ulcers or lesions     NECK: no adenopathy, no asymmetry, masses, or scars and thyroid normal to palpation     RESP: lungs clear to auscultation - no rales, rhonchi or wheezes     BREAST: normal without masses, tenderness or nipple discharge and no palpable axillary masses or adenopathy     CV: regular rates and rhythm, normal S1 S2, no S3 or S4 and no murmur, click or rub     ABDOMEN:  soft, nontender, no HSM or masses and bowel sounds normal     MS: extremities normal- no gross deformities noted, no evidence of inflammation in joints, FROM in all extremities.     SKIN: no suspicious lesions or rashes     NEURO: Normal strength and tone, sensory exam grossly normal, mentation intact and speech normal     PSYCH: mentation appears normal. and affect normal/bright     LYMPHATICS: No cervical adenopathy    Recent Labs   Lab Test 11/24/20  0916 10/30/20  1445 10/30/20  1443   HGB 11.8* 11.4*  --     272  --    INR  --   --  0.98     --  141   K 3.7  --  3.7   CREATININE 0.62  --  0.70        PRE-OP Diagnostics:   No labs were ordered during this visit.  No EKG required for low risk surgery (cataract, skin procedure, breast biopsy, etc).    REVISED CARDIAC RISK INDEX (RCRI)   The patient has the following serious cardiovascular risks for perioperative complications:   - No serious cardiac risks = 0 points    RCRI INTERPRETATION: 0 points: Class I (very low risk - 0.4% complication rate)         Assessment & Plan      The proposed surgical procedure is considered " LOW risk.    Problem List Items Addressed This Visit     None      Visit Diagnoses     Age-related cataract of both eyes, unspecified age-related cataract type    -  Primary    Allergic rhinitis due to pollen, unspecified seasonality        Relevant Medications    pseudoephedrine-guaifenesin (MUCINEX D)  mg per tablet    Preop exam for internal medicine               Risks and Recommendations:  The patient has the following additional risks and recommendations for perioperative complications:   - No identified additional risk factors other than previously addressed        RECOMMENDATION:  APPROVAL GIVEN to proceed with proposed procedure, without further diagnostic evaluation.    Signed Electronically by: Maria T Olmos MD    Copy of this evaluation report is provided to requesting physician.    Preop [facility] Preop Guidelines    Revised Cardiac Risk Index

## 2021-06-14 NOTE — PROGRESS NOTES
Spoke with patient who states that she will be removing her stent today.  She has no questions and will call as needed.  Laura Love RN

## 2021-06-14 NOTE — ANESTHESIA PREPROCEDURE EVALUATION
Anesthesia Evaluation      Patient summary reviewed   No history of anesthetic complications     Airway   Mallampati: II  Neck ROM: full   Pulmonary     breath sounds clear to auscultation  (-) COPD, asthma, shortness of breath, sleep apnea, not a smoker                         Cardiovascular - negative ROS  Exercise tolerance: > or = 4 METS  ECG reviewed (NSR, QTc 458)  Rhythm: regular  Rate: normal,         Neuro/Psych - negative ROS   (-) no CVA    Comments: Post herpetic neuralgia    Endo/Other - negative ROS   (-) no diabetes, hypothyroidism, no obesity     Comments: Inflammatory osteoarthritis      GI/Hepatic/Renal - negative ROS   (+) hiatal hernia,   chronic renal disease (nephrolithiasis),      Other findings:   COVID negative 1/10/21    Labs 11/24/20  Hbg 11.8  Plt 265  Na 142  K 3.7  Cr 0.62      Dental      Comment: Porcelain upper teeth, otherwise no loose or removable teeth                       Anesthesia Plan  Planned anesthetic: general endotracheal and total IV anesthesia  TIVA, propofol gtt   Ketamine 25 mg on induction  Dexamethasone 8 mg, ondansetron 4 mg   ASA 2   Induction: intravenous   Anesthetic plan and risks discussed with: patient  Anesthesia plan special considerations: video-assisted, antiemetics,   Post-op plan: routine recovery

## 2021-06-15 NOTE — PATIENT INSTRUCTIONS - HE
Patient Stated Goal: Pass my stone  Symptom Control While Passing A Stone    The goal of [facility] is to let a smaller kidney stone (less than 4 to 5 mm) pass without intervention if possible. Giving your body a chance to clear the stone may take a few hours up to a few weeks.  Keeping you well-informed, safe and fairly comfortable is important.    Drink to thirst  Do not attempt to  flush out  your stone by drinking too much fluid. This does not work and may increase nausea. Drink enough to satisfy your body s thirst. Eating your normal diet is fine.   Medications (that may be suggested or prescribed)    Ibuprofen (Advil or Motrin) Available over the counter  o Take two (200mg) tablets every six hours until the stone passes.  o Prevents spasm of the ureter.    o Decreases pain.      Dramamine* (drowsy version, non-generic formulation) Available over the counter  o Take 50mg at bedtime  o Decreases spasms of the ureter  o Decreases nausea  o Decreases acute pain  o Decreases recurrence of pain for 24 hours  o Will help you sleep  *This medication will cause increase drowsiness, do not drive or operate machinery for 6 hours.      Narcotics (Percocet, Vicodin, Dilaudid) Take as prescribed for severe pain unrelieved by ibuprofen and Dramamine  o Narcotics have significant side effects and only  cover-up  pain. They have no effect on the cause of pain.  o Common side effects  - Confusion, disorientation and sedation - DO NOT DRIVE OR OPERATE MACHINERY WITHIN 24 HOURS  - Nausea - take Dramamine or Zofran or Haldol to help control  - Constipation  - Sleep disturbances      Ondansetron (Zofran) Take as prescribed  o Reserve for severe nausea  o May cause constipation, start over the counter Miralax if needed      Second Line Anti-Nausea Medication: Adding a different anti-nausea medication maybe helpful for persistent nausea.  The combined effect of different types of anti-nausea medications maybe more  effective than either medication by itself, even in higher doses.  o Compazine: Take as prescribed      Information about kidney stones    Crystals can form if chemicals are too concentrated in your urine. If the crystal grows over time, a stone may form. A stone usually isn t painful while it is still in the kidney.    As the stone begins to leave the kidney, you may experience episodes of flank pain as the kidney stone approaches the entrance to the ureter. Some people feel a vague ache in the side.    Kidney stones may fall into the ureter. Some stones are tiny and pass through without causing symptoms. The ureter is a small tube (approximately 1/8 of an inch wide). A kidney stone can get stuck and block the ureter. If this happens, urine backs up and flows back to the kidney. Back pressure on the kidney can cause:  o Severe flank pain radiating to the groin.  o Severe nausea and vomiting.  o The pain can occur in the lower back, side, groin or all three.      When the stone reaches the lower ureter, this can irritate the bladder and sensations of feeling the urge to urinate frequently and urgently may occur.      Once the stone passes out of your ureter and into your bladder, the symptoms of urgency and frequency will often disappear. Sometimes pain will come back for a short period and will not be as severe as before. The passage of the stone from your bladder and out of your body is usually not a problem. The urethra is at least twice as wide as the normal ureter, so the stone doesn t usually block it.    Strain all urine  If you pass the stone, save it. Place it in the container we have provided and bring it to the [facility] within a week of passing it. Your stone will then be sent for analysis which takes about a month.     Signs and symptoms you might experience    Nausea    Decreased appetite    Urinary frequency    Bloody urine     Chills    Fatigue    When to call [facility] or  go to the Emergency Room    Fever with a temperature greater than 100.1    Severe pain    Persistent nausea/vomiting    If the pain worsens or nausea/vomiting is uncontrolled with medications, STOP eating & drinking. You need to have an empty stomach for 8 hours prior to surgery. Call the [facility] immediately at [phone].           Follow-up    Low dose CT scan with doctor visit 1-2 weeks after initial clinic visit per doctor s instructions    Please cancel the CT scan visit if you pass a stone. Reschedule for a one month follow-up with doctor to discuss stone composition and future prevention.    Preventing future stones    Approximately a month after your stone is sent out for analysis, a prevention visit will occur with your provider, to discuss an individualized plan for prevention of new stones and to discuss managing stones that you may still have. Along with the analysis of the kidney stone, blood and urine tests may be indicated to develop this plan. Knowing the type of kidney stones you make, and why, allows the providers at the [facility] to recommend specific ways to prevent them.    Follow-up visits are an important part of monitoring and preventing future re-occurrences.    [facility] is available for questions or concerns 24 hours a day at [phone]

## 2021-06-15 NOTE — PROGRESS NOTES
Assessment/Plan:    Assessment & Plan   Shelbie was seen today for follow-up.    Diagnoses and all orders for this visit:    Calculus of ureter  -     Symptom Control While Passing a Stone Education  -     CT Abdomen Pelvis Without Oral Without IV Contrast; Future  -     Patient Stated Goal: Pass my stone        Stone Management Plan  [facility] 1/6/2021 2/18/2021 3/4/2021   Urinary Tract Infection Possible Infection No suspicion of infection No suspicion of infection   Renal Colic Well controlled symptoms Well controlled symptoms Well controlled symptoms   Renal Failure No suspicion of renal failure No suspicion of renal failure No suspicion of renal failure   Current CT date 9/23/2020 2/16/2021 3/3/2021   Right sided stones? Yes Yes Yes   R Number of ureteral stones No ureteral stones 1 1   R GSD of ureteral stones - 4 4   R Location of ureteral stone - Distal Distal   R Number of kidney stones  4 2 2   R GSD of kidney stones 2 - 4 < 2 < 2   R Hydronephrosis None Mild None   R Stone Event No current event New event Established event   Diagnosis date - 2/16/2021 -   Initial location of primary symptomatic stone - Distal -   Initial GSD of primary symptomatic stone - 4 -   R MET status - Initiation No progression   R Current Plan Observe MET MET   MET - 2 week F/U -   Observe rationale Limited stone burden with good prognosis for spontaneous passage - -   Left sided stones? Yes Yes Yes   L Number of ureteral stones No ureteral stones No ureteral stones No ureteral stones   L Number of kidney stones  3 1 1   L GSD of kidney stones 15 - 20 < 2 < 2   L Hydronephrosis None None None   L Stone Event New event Resolved event No current event   Diagnosis date 9/23/2020 - -   Initial location of primary symptomatic stone Renal - -   Initial GSD of primary symptomatic stone > 15 - -   Resolved date - 2/18/2021 -   Post-op status - <2 mm -   L Current Plan Clear Observe Observe   Clear rationale Poor prognosis -  -   Observe rationale - Limited stone burden with good prognosis for spontaneous passage Limited stone burden with good prognosis for spontaneous passage             PLAN    Return in about 1 week (around 3/11/2021).    Phone call duration: 13 minutes  17 minutes spent on the date of the encounter doing chart review, history and exam, documentation and further activities as noted above    Shaan Hinkle MD  [facility]    Subjective:      HPI  Ms. Shelbie Doyle is a 80 y.o.  female who is being evaluated via a billable telephone visit by [facility] for medical expulsive therapy follow up.     On last encounter, her 4 mm stone was in right distal ureter with no hydronephrosis. She has had no unanticipated events.    Symptoms have been well controlled with medication and she is able to carry on normal activities. She is asymptomatic at present. She denies symptoms of fever, chills, flank pain, nausea, vomiting, urinary frequency and dysuria.     New CT scan was personally reviewed and demonstrates no progression of stone with resolution of previous hydronephrosis.     She will continue to attempt to pass stone and will return in 1 month with further imaging. If the stone is still present, she should probably proceed to clearance..         ROS   Review of systems is negative except for HPI.    No past medical history on file.    Past Surgical History:   Procedure Laterality Date     CARPAL TUNNEL RELEASE       FACIAL COSMETIC SURGERY       REDUCTION MAMMAPLASTY         Current Outpatient Medications   Medication Sig Dispense Refill     amLODIPine (NORVASC) 2.5 MG tablet Take 1 tablet (2.5 mg total) by mouth daily. 30 tablet 11     aspirin 81 MG EC tablet Take 81 mg by mouth daily.       calcium citrate/vitamin D3 (CALCIUM CITRATE + D ORAL) Take by mouth.       celecoxib (CELEBREX) 100 MG capsule Take 100 mg by mouth 2 (two) times  a day.       denosumab 60 mg/mL Syrg Inject 60 mg under the skin once. December 30th       meclizine (ANTIVERT) 25 mg tablet Take 1 tablet (25 mg total) by mouth 4 (four) times a day as needed for dizziness. 28 tablet 0     omega-3/dha/epa/dpa/fish oil (OMEGA-3 2100 ORAL) Take by mouth.       omeprazole (PRILOSEC) 20 MG capsule Take 1 capsule (20 mg total) by mouth daily before breakfast. 30 capsule 1     polyethylene glycol (MIRALAX) 17 gram packet Take 17 g by mouth daily.       potassium citrate (UROCIT-K 15) 15 mEq TbER Take 1 tablet by mouth 3 (three) times a day. 180 tablet 4     pseudoephedrine-guaifenesin (MUCINEX D)  mg per tablet Take 1 tablet by mouth every 12 (twelve) hours. 90 tablet 0     rosuvastatin (CRESTOR) 5 MG tablet Take 1 tablet (5 mg total) by mouth daily. 90 tablet 0     timoloL maleate (TIMOPTIC) 0.5 % ophthalmic solution 1 drop 2 (two) times a day.       UNABLE TO FIND Med Name: multi collagen joint mobility       ondansetron (ZOFRAN-ODT) 4 MG disintegrating tablet Take 1 tablet (4 mg total) by mouth every 8 (eight) hours as needed for nausea. 14 tablet 0     phenazopyridine (PYRIDIUM) 100 MG tablet Take 1 tablet (100 mg total) by mouth 3 (three) times a day as needed for pain. 20 tablet 0     No current facility-administered medications for this visit.        Allergies   Allergen Reactions     Atorvastatin Calcium Unknown     Cerivastatin Sodium Unknown     Doxycycline Unknown     Fosamax [Alendronate] Unknown     Esophagea stricture     Pravastatin Unknown     Rosuvastatin Calcium Unknown       Social History     Socioeconomic History     Marital status:      Spouse name: Not on file     Number of children: Not on file     Years of education: Not on file     Highest education level: Not on file   Occupational History     Not on file   Social Needs     Financial resource strain: Not on file     Food insecurity     Worry: Not on file     Inability: Not on file      Transportation needs     Medical: Not on file     Non-medical: Not on file   Tobacco Use     Smoking status: Never Smoker     Smokeless tobacco: Never Used   Substance and Sexual Activity     Alcohol use: Never     Frequency: Never     Drug use: Never     Sexual activity: Not on file   Lifestyle     Physical activity     Days per week: Not on file     Minutes per session: Not on file     Stress: Not on file   Relationships     Social connections     Talks on phone: Not on file     Gets together: Not on file     Attends Faith service: Not on file     Active member of club or organization: Not on file     Attends meetings of clubs or organizations: Not on file     Relationship status: Not on file     Intimate partner violence     Fear of current or ex partner: Not on file     Emotionally abused: Not on file     Physically abused: Not on file     Forced sexual activity: Not on file   Other Topics Concern     Not on file   Social History Narrative    She is  (1951) and her  has delusional dementia and parkinson's disease and started the [facility] at .  They have 3 sons, 6 grandchildren.  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       Family History   Problem Relation Age of Onset     Heart disease Mother          age 78 of MI     Heart disease Father      Prostate cancer Father          age 86     Mental illness Sister      Heart disease Brother        Objective:      No vitals or physical exam obtained due to virtual visit  Labs   Urinalysis POC (Office):  Nitrite, UA   Date Value Ref Range Status   2021 Negative Negative Final   12/15/2020 Negative Negative Final   2020 Negative Negative Final       Lab Urinalysis:  Blood, UA   Date Value Ref Range Status   2021 Negative Negative Final   12/15/2020 Small (!) Negative Final   2020 Trace (!) Negative Final     Nitrite, UA   Date Value Ref Range Status   2021 Negative Negative Final    12/15/2020 Negative Negative Final   11/24/2020 Negative Negative Final     Leukocytes, UA   Date Value Ref Range Status   02/12/2021 Small (!) Negative Final   12/15/2020 Trace (!) Negative Final   11/24/2020 Negative Negative Final     pH, UA   Date Value Ref Range Status   02/12/2021 6.0 4.5 - 8.0 Final   12/15/2020 6.5 5.0 - 8.0 Final   11/24/2020 6.0 5.0 - 8.0 Final    and Acute Labs   Urine Culture    Culture   Date Value Ref Range Status   02/12/2021 No Growth  Final   12/15/2020 No Growth  Final   09/02/2020 No Growth  Final

## 2021-06-15 NOTE — TELEPHONE ENCOUNTER
Patient calling stating that she is having a lot of pressure in her bladder along with urinary urgency and frequency.  She will drop off a urine to test for infection at  today.  Laura Love RN

## 2021-06-15 NOTE — PROGRESS NOTES
[facility] Follow Up Note    Shelbie Doyle   77 y.o. female    Date of Visit: 1/9/2018    Chief Complaint   Patient presents with     Follow-up     6 week follow up     Subjective  Shelbie is in today for follow-up.  I met her 6 weeks ago and she has a lot of anxiety and caregiver stress.  We tried her on a little bit of Lexapro and she had some vertigo and dizziness as a response.  She was very pleased that she went and did a coronary calcium score and it came back as 0.  She now no longer needs to worry about her heart or trying to reduce her cholesterol with a statin medication.  They also discovered a small hiatal hernia.  Her bones came back with some low bone mass with a slight decline of 8% in her hips.    ROS A comprehensive review of systems was performed and was otherwise negative    Social History:   Social History     Social History Narrative    She is  (1951) and her  has delusional dementia and parkinson's disease and started the [facility] at .  They have 3 sons, 6 grandchildren.  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       Medications were reconciled.  Allergies, social and family history, and the problem list were all reviewed and updated.    Old records reviewed: Bone density scan results, coronary calcium score results    Exam  General Appearance: Pleasant and alert  Vitals:    01/09/18 1035   BP: 118/74   Patient Site: Left Arm   Patient Position: Sitting   Cuff Size: Adult Regular   Pulse: 81   Weight: 124 lb (56.2 kg)      Body mass index is 22.68 kg/(m^2).  Wt Readings from Last 3 Encounters:   01/09/18 124 lb (56.2 kg)   11/20/17 123 lb 14.4 oz (56.2 kg)     HEENT: Sclera are clear.   Lungs: Normal respirations  Abdomen: Soft and nondistended  Extremities: No edema  Skin: No rashes  Neuro: Moves all extremities and has facial symmetry  Gait: Ambulates with a normal gait    Assessment/Plan  1. Vitamin D  deficiency  Check a level she returns for a physical later this year.    2. Disorder of lipoid metabolism  She was reassured now that she has a negative coronary calcium score that we do not need to push a statin medication.    3. Adjustment disorder with depressed mood  Discussed the use of as needed anxiety medications.  She wants to hold off anything scheduled for now.    4. Hiatal hernia  Discussed the use of Pepcid AC as needed.          Esther Smith MD  1/9/2018    Much or all of the text in this note was generated through the use of Dragon Dictate voice-to-text software. Errors in spelling or words which seem out of context are unintentional. Sound alike errors, in particular, may have escaped editing.

## 2021-06-15 NOTE — PATIENT INSTRUCTIONS - HE
Patient Stated Goal: Pass my stone  Symptom Control While Passing A Stone    The goal of [facility] is to let a smaller kidney stone (less than 4 to 5 mm) pass without intervention if possible. Giving your body a chance to clear the stone may take a few hours up to a few weeks.  Keeping you well-informed, safe and fairly comfortable is important.    Drink to thirst  Do not attempt to  flush out  your stone by drinking too much fluid. This does not work and may increase nausea. Drink enough to satisfy your body s thirst. Eating your normal diet is fine.   Medications (that may be suggested or prescribed)    Ibuprofen (Advil or Motrin) Available over the counter  o Take two (200mg) tablets every six hours until the stone passes.  o Prevents spasm of the ureter.    o Decreases pain.      Dramamine* (drowsy version, non-generic formulation) Available over the counter  o Take 50mg at bedtime  o Decreases spasms of the ureter  o Decreases nausea  o Decreases acute pain  o Decreases recurrence of pain for 24 hours  o Will help you sleep  *This medication will cause increase drowsiness, do not drive or operate machinery for 6 hours.      Narcotics (Percocet, Vicodin, Dilaudid) Take as prescribed for severe pain unrelieved by ibuprofen and Dramamine  o Narcotics have significant side effects and only  cover-up  pain. They have no effect on the cause of pain.  o Common side effects  - Confusion, disorientation and sedation - DO NOT DRIVE OR OPERATE MACHINERY WITHIN 24 HOURS  - Nausea - take Dramamine or Zofran or Haldol to help control  - Constipation  - Sleep disturbances      Ondansetron (Zofran) Take as prescribed  o Reserve for severe nausea  o May cause constipation, start over the counter Miralax if needed      Second Line Anti-Nausea Medication: Adding a different anti-nausea medication maybe helpful for persistent nausea.  The combined effect of different types of anti-nausea medications maybe more  effective than either medication by itself, even in higher doses.  o Compazine: Take as prescribed      Information about kidney stones    Crystals can form if chemicals are too concentrated in your urine. If the crystal grows over time, a stone may form. A stone usually isn t painful while it is still in the kidney.    As the stone begins to leave the kidney, you may experience episodes of flank pain as the kidney stone approaches the entrance to the ureter. Some people feel a vague ache in the side.    Kidney stones may fall into the ureter. Some stones are tiny and pass through without causing symptoms. The ureter is a small tube (approximately 1/8 of an inch wide). A kidney stone can get stuck and block the ureter. If this happens, urine backs up and flows back to the kidney. Back pressure on the kidney can cause:  o Severe flank pain radiating to the groin.  o Severe nausea and vomiting.  o The pain can occur in the lower back, side, groin or all three.      When the stone reaches the lower ureter, this can irritate the bladder and sensations of feeling the urge to urinate frequently and urgently may occur.      Once the stone passes out of your ureter and into your bladder, the symptoms of urgency and frequency will often disappear. Sometimes pain will come back for a short period and will not be as severe as before. The passage of the stone from your bladder and out of your body is usually not a problem. The urethra is at least twice as wide as the normal ureter, so the stone doesn t usually block it.    Strain all urine  If you pass the stone, save it. Place it in the container we have provided and bring it to the [facility] within a week of passing it. Your stone will then be sent for analysis which takes about a month.     Signs and symptoms you might experience    Nausea    Decreased appetite    Urinary frequency    Bloody urine     Chills    Fatigue    When to call [facility] or  go to the Emergency Room    Fever with a temperature greater than 100.1    Severe pain    Persistent nausea/vomiting    If the pain worsens or nausea/vomiting is uncontrolled with medications, STOP eating & drinking. You need to have an empty stomach for 8 hours prior to surgery. Call the [facility] immediately at [phone].           Follow-up    Low dose CT scan with doctor visit 1-2 weeks after initial clinic visit per doctor s instructions    Please cancel the CT scan visit if you pass a stone. Reschedule for a one month follow-up with doctor to discuss stone composition and future prevention.    Preventing future stones    Approximately a month after your stone is sent out for analysis, a prevention visit will occur with your provider, to discuss an individualized plan for prevention of new stones and to discuss managing stones that you may still have. Along with the analysis of the kidney stone, blood and urine tests may be indicated to develop this plan. Knowing the type of kidney stones you make, and why, allows the providers at the [facility] to recommend specific ways to prevent them.    Follow-up visits are an important part of monitoring and preventing future re-occurrences.    The [facility] is available for questions or concerns 24 hours a day at [phone]

## 2021-06-15 NOTE — PROGRESS NOTES
Assessment/Plan:    Assessment & Plan   Shelbie was seen today for late post op follow-up.    Diagnoses and all orders for this visit:    Calculus of ureter  -     Symptom Control While Passing a Stone Education  -     Patient Stated Goal: Pass my stone  -     CT Abdomen Pelvis Without Oral Without IV Contrast; Future        Stone Management Plan  [facility] 1/6/2021 2/18/2021   Urinary Tract Infection Possible Infection No suspicion of infection   Renal Colic Well controlled symptoms Well controlled symptoms   Renal Failure No suspicion of renal failure No suspicion of renal failure   Current CT date 9/23/2020 2/16/2021   Right sided stones? Yes Yes   R Number of ureteral stones No ureteral stones 1   R GSD of ureteral stones - 4   R Location of ureteral stone - Distal   R Number of kidney stones  4 2   R GSD of kidney stones 2 - 4 < 2   R Hydronephrosis None Mild   R Stone Event No current event New event   Diagnosis date - 2/16/2021   Initial location of primary symptomatic stone - Distal   Initial GSD of primary symptomatic stone - 4   R MET status - Initiation   R Current Plan Observe MET   MET - 2 week F/U   Observe rationale Limited stone burden with good prognosis for spontaneous passage -   Left sided stones? Yes Yes   L Number of ureteral stones No ureteral stones No ureteral stones   L Number of kidney stones  3 1   L GSD of kidney stones 15 - 20 < 2   L Hydronephrosis None None   L Stone Event New event Resolved event   Diagnosis date 9/23/2020 -   Initial location of primary symptomatic stone Renal -   Initial GSD of primary symptomatic stone > 15 -   Resolved date - 2/18/2021   Post-op status - <2 mm   L Current Plan Clear Observe   Clear rationale Poor prognosis -   Observe rationale - Limited stone burden with good prognosis for spontaneous passage             PLAN    Return in about 2 weeks (around 3/4/2021).    Phone call duration: 15 minutes  25 minutes spent on the date of the  encounter doing chart review, history and exam, documentation and further activities as noted above    Shaan Hinkle MD  [facility]    Subjective:      HPI  Ms. Shelbie Doyle is a 80 y.o.  female who is being evaluated via a billable telephone visit by [facility] [place] for late postoperative follow-up.     She returns status post Left ureteroscopic laser lithotripsy for renal stone. She has had excellent relief of her bladder symptoms. She has now had some vague abdominal discomfort and gnawing pain. Denies right sided pain.    New CT scan was personally reviewed and demonstrates largest residual fragment is 2 mm in the left renal midpole with no hydronephrosis. A 4 mm stone has migrated from the right kidney to the UVJ.    Stone composition was 100% calcium oxalate.     It is possible that the right distal stone has been present for many months and has contributed to her prior bladder symptoms or that it has just dropped in the last week.     We had a good discussion and she would like to allow a 2 week trial of passage. She does have travel to [place] planned in about a month after she receives her second COVID vaccine.         ROS   Review of systems is negative except for HPI.    No past medical history on file.    Past Surgical History:   Procedure Laterality Date     CARPAL TUNNEL RELEASE       FACIAL COSMETIC SURGERY       REDUCTION MAMMAPLASTY         Current Outpatient Medications   Medication Sig Dispense Refill     amLODIPine (NORVASC) 2.5 MG tablet Take 1 tablet (2.5 mg total) by mouth daily. 30 tablet 11     aspirin 81 MG EC tablet Take 81 mg by mouth daily.       calcium citrate/vitamin D3 (CALCIUM CITRATE + D ORAL) Take by mouth.       celecoxib (CELEBREX) 100 MG capsule Take 100 mg by mouth 2 (two) times a day.       denosumab 60 mg/mL Syrg Inject 60 mg under the skin once. December 30th       meclizine (ANTIVERT)  25 mg tablet Take 1 tablet (25 mg total) by mouth 4 (four) times a day as needed for dizziness. 28 tablet 0     omega-3/dha/epa/dpa/fish oil (OMEGA-3 2100 ORAL) Take by mouth.       omeprazole (PRILOSEC) 20 MG capsule Take 1 capsule (20 mg total) by mouth daily before breakfast. 30 capsule 1     ondansetron (ZOFRAN-ODT) 4 MG disintegrating tablet Take 1 tablet (4 mg total) by mouth every 8 (eight) hours as needed for nausea. 14 tablet 0     phenazopyridine (PYRIDIUM) 100 MG tablet Take 1 tablet (100 mg total) by mouth 3 (three) times a day as needed for pain. 20 tablet 0     polyethylene glycol (MIRALAX) 17 gram packet Take 17 g by mouth daily.       potassium citrate (UROCIT-K 15) 15 mEq TbER Take 1 tablet by mouth 3 (three) times a day. 180 tablet 4     pseudoephedrine-guaifenesin (MUCINEX D)  mg per tablet Take 1 tablet by mouth every 12 (twelve) hours. 90 tablet 0     rosuvastatin (CRESTOR) 5 MG tablet Take 1 tablet (5 mg total) by mouth daily. 90 tablet 0     timoloL maleate (TIMOPTIC) 0.5 % ophthalmic solution 1 drop 2 (two) times a day.       UNABLE TO FIND Med Name: multi collagen joint mobility       No current facility-administered medications for this visit.        Allergies   Allergen Reactions     Atorvastatin Calcium Unknown     Cerivastatin Sodium Unknown     Doxycycline Unknown     Fosamax [Alendronate] Unknown     Esophagea stricture     Pravastatin Unknown     Rosuvastatin Calcium Unknown       Social History     Socioeconomic History     Marital status:      Spouse name: Not on file     Number of children: Not on file     Years of education: Not on file     Highest education level: Not on file   Occupational History     Not on file   Social Needs     Financial resource strain: Not on file     Food insecurity     Worry: Not on file     Inability: Not on file     Transportation needs     Medical: Not on file     Non-medical: Not on file   Tobacco Use     Smoking status: Never Smoker      Smokeless tobacco: Never Used   Substance and Sexual Activity     Alcohol use: Never     Frequency: Never     Drug use: Never     Sexual activity: Not on file   Lifestyle     Physical activity     Days per week: Not on file     Minutes per session: Not on file     Stress: Not on file   Relationships     Social connections     Talks on phone: Not on file     Gets together: Not on file     Attends Religion service: Not on file     Active member of club or organization: Not on file     Attends meetings of clubs or organizations: Not on file     Relationship status: Not on file     Intimate partner violence     Fear of current or ex partner: Not on file     Emotionally abused: Not on file     Physically abused: Not on file     Forced sexual activity: Not on file   Other Topics Concern     Not on file   Social History Narrative    She is  (1951) and her  has delusional dementia and parkinson's disease and started the [facility] at .  They have 3 sons, 6 grandchildren.  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       Family History   Problem Relation Age of Onset     Heart disease Mother          age 78 of MI     Heart disease Father      Prostate cancer Father          age 86     Mental illness Sister      Heart disease Brother        Objective:      No vitals or physical exam obtained due to virtual visit  Labs   Urinalysis POC (Office):  Nitrite, UA   Date Value Ref Range Status   2021 Negative Negative Final   12/15/2020 Negative Negative Final   2020 Negative Negative Final       Lab Urinalysis:  Blood, UA   Date Value Ref Range Status   2021 Negative Negative Final   12/15/2020 Small (!) Negative Final   2020 Trace (!) Negative Final     Nitrite, UA   Date Value Ref Range Status   2021 Negative Negative Final   12/15/2020 Negative Negative Final   2020 Negative Negative Final     Leukocytes, UA   Date Value Ref Range Status    02/12/2021 Small (!) Negative Final   12/15/2020 Trace (!) Negative Final   11/24/2020 Negative Negative Final     pH, UA   Date Value Ref Range Status   02/12/2021 6.0 4.5 - 8.0 Final   12/15/2020 6.5 5.0 - 8.0 Final   11/24/2020 6.0 5.0 - 8.0 Final    and Acute Labs   Urine Culture    Culture   Date Value Ref Range Status   02/12/2021 No Growth  Final   12/15/2020 No Growth  Final   09/02/2020 No Growth  Final

## 2021-06-16 PROBLEM — N20.1 CALCULUS OF URETER: Status: ACTIVE | Noted: 2021-05-20

## 2021-06-16 PROBLEM — K44.9 HIATAL HERNIA: Status: ACTIVE | Noted: 2018-01-09

## 2021-06-16 PROBLEM — N20.0 CALCULUS OF KIDNEY: Status: ACTIVE | Noted: 2021-01-06

## 2021-06-16 PROBLEM — N20.0 RENAL LITHIASIS: Status: ACTIVE | Noted: 2020-12-15

## 2021-06-16 LAB — BACTERIA SPEC CULT: NORMAL

## 2021-06-16 NOTE — TELEPHONE ENCOUNTER
Patient calling stating that she is having some bladder spasms which could be her stone.  She would like to put off having a CT scan.  Laura Love RN    Message left for patient. Per provider, best way to evaluate is a low dose CT scan, so very little radiation.  If she wants to forego the CT for now, she should keep her tele call and go from there.  Laura Love RN    Patient returned call and states that she will keep her CT appt and f/u tele call, she will call back with any issues.  Laura Love RN

## 2021-06-16 NOTE — PATIENT INSTRUCTIONS - HE
Symptom Control While Passing A Stone    The goal of [facility] [place] is to let a smaller kidney stone (less than 4 to 5 mm) pass without intervention if possible. Giving your body a chance to clear the stone may take a few hours up to a few weeks.  Keeping you well-informed, safe and fairly comfortable is important.    Drink to thirst  Do not attempt to  flush out  your stone by drinking too much fluid. This does not work and may increase nausea. Drink enough to satisfy your body s thirst. Eating your normal diet is fine.   Medications (that may be suggested or prescribed)    Ibuprofen (Advil or Motrin) Available over the counter  o Take two (200mg) tablets every six hours until the stone passes.  o Prevents spasm of the ureter.    o Decreases pain.      Dramamine* (drowsy version, non-generic formulation) Available over the counter  o Take 50mg at bedtime  o Decreases spasms of the ureter  o Decreases nausea  o Decreases acute pain  o Decreases recurrence of pain for 24 hours  o Will help you sleep  *This medication will cause increase drowsiness, do not drive or operate machinery for 6 hours.      Narcotics (Percocet, Vicodin, Dilaudid) Take as prescribed for severe pain unrelieved by ibuprofen and Dramamine  o Narcotics have significant side effects and only  cover-up  pain. They have no effect on the cause of pain.  o Common side effects  - Confusion, disorientation and sedation - DO NOT DRIVE OR OPERATE MACHINERY WITHIN 24 HOURS  - Nausea - take Dramamine or Zofran or Haldol to help control  - Constipation  - Sleep disturbances      Ondansetron (Zofran) Take as prescribed  o Reserve for severe nausea  o May cause constipation, start over the counter Miralax if needed      Second Line Anti-Nausea Medication: Adding a different anti-nausea medication maybe helpful for persistent nausea.  The combined effect of different types of anti-nausea medications maybe more effective than either medication by  itself, even in higher doses.  o Compazine: Take as prescribed      Information about kidney stones    Crystals can form if chemicals are too concentrated in your urine. If the crystal grows over time, a stone may form. A stone usually isn t painful while it is still in the kidney.    As the stone begins to leave the kidney, you may experience episodes of flank pain as the kidney stone approaches the entrance to the ureter. Some people feel a vague ache in the side.    Kidney stones may fall into the ureter. Some stones are tiny and pass through without causing symptoms. The ureter is a small tube (approximately 1/8 of an inch wide). A kidney stone can get stuck and block the ureter. If this happens, urine backs up and flows back to the kidney. Back pressure on the kidney can cause:  o Severe flank pain radiating to the groin.  o Severe nausea and vomiting.  o The pain can occur in the lower back, side, groin or all three.      When the stone reaches the lower ureter, this can irritate the bladder and sensations of feeling the urge to urinate frequently and urgently may occur.      Once the stone passes out of your ureter and into your bladder, the symptoms of urgency and frequency will often disappear. Sometimes pain will come back for a short period and will not be as severe as before. The passage of the stone from your bladder and out of your body is usually not a problem. The urethra is at least twice as wide as the normal ureter, so the stone doesn t usually block it.    Strain all urine  If you pass the stone, save it. Place it in the container we have provided and bring it to the [facility] within a week of passing it. Your stone will then be sent for analysis which takes about a month.     Signs and symptoms you might experience    Nausea    Decreased appetite    Urinary frequency    Bloody urine     Chills    Fatigue    When to call [facility] or go to the Emergency Room    Fever  with a temperature greater than 100.1    Severe pain    Persistent nausea/vomiting    If the pain worsens or nausea/vomiting is uncontrolled with medications, STOP eating & drinking. You need to have an empty stomach for 8 hours prior to surgery. Call the [facility] immediately at [phone].           Follow-up    Low dose CT scan with doctor visit 1-2 weeks after initial clinic visit per doctor s instructions    Please cancel the CT scan visit if you pass a stone. Reschedule for a one month follow-up with doctor to discuss stone composition and future prevention.    Preventing future stones    Approximately a month after your stone is sent out for analysis, a prevention visit will occur with your provider, to discuss an individualized plan for prevention of new stones and to discuss managing stones that you may still have. Along with the analysis of the kidney stone, blood and urine tests may be indicated to develop this plan. Knowing the type of kidney stones you make, and why, allows the providers at the [facility] to recommend specific ways to prevent them.    Follow-up visits are an important part of monitoring and preventing future re-occurrences.    The [facility] is available for questions or concerns 24 hours a day at [phone]

## 2021-06-16 NOTE — PROGRESS NOTES
Assessment/Plan:    Assessment & Plan   Shelbie was seen today for met follow up.    Diagnoses and all orders for this visit:    Calculus of ureter  -     Symptom Control While Passing a Stone Education  -     CT Abdomen Pelvis Without Oral Without IV Contrast; Future        Stone Management Plan  [facility] 2/18/2021 3/4/2021 4/15/2021   Urinary Tract Infection No suspicion of infection No suspicion of infection -   Renal Colic Well controlled symptoms Well controlled symptoms -   Renal Failure No suspicion of renal failure No suspicion of renal failure -   Current CT date 2/16/2021 3/3/2021 4/14/2021   Right sided stones? Yes Yes Yes   R Number of ureteral stones 1 1 1   R GSD of ureteral stones 4 4 4   R Location of ureteral stone Distal Distal Distal   R Number of kidney stones  2 2 2   R GSD of kidney stones < 2 < 2 < 2   R Hydronephrosis Mild None None   R Stone Event New event Established event Established event   Diagnosis date 2/16/2021 - -   Initial location of primary symptomatic stone Distal - -   Initial GSD of primary symptomatic stone 4 - -   R MET status Initiation No progression No progression   R Current Plan MET MET MET   MET 2 week F/U - -   Observe rationale - - -   Left sided stones? Yes Yes Yes   L Number of ureteral stones No ureteral stones No ureteral stones No ureteral stones   L Number of kidney stones  1 1 1   L GSD of kidney stones < 2 < 2 < 2   L Hydronephrosis None None None   L Stone Event Resolved event No current event No current event   Diagnosis date - - -   Initial location of primary symptomatic stone - - -   Initial GSD of primary symptomatic stone - - -   Resolved date 2/18/2021 - -   Post-op status <2 mm - -   L Current Plan Observe Observe -   Clear rationale - - -   Observe rationale Limited stone burden with good prognosis for spontaneous passage Limited stone burden with good prognosis for spontaneous passage -             PLAN    No follow-ups on  file.    Phone call duration: 10 minutes  15 minutes spent on the date of the encounter doing chart review, history and exam, documentation and further activities per the note    Shaan Hinkle MD  [facility]    Subjective:      HPI  Ms. Shelbie Doyle is a 80 y.o.  female who is being evaluated via a billable telephone visit by [facility] [place] for medical expulsive therapy follow up.     On last encounter, her 4 mm stone was in right distal ureter with no hydronephrosis. She has had no unanticipated events.    Symptoms have been minimal . Significant current symptoms include:  dysuria. Pertinent negative current symptoms include:  fever, chills, right flank pain, nausea, vomiting and hematuria. She has been having some increased nocturia and takes low dose ibuprofen occasionally for pain.    New CT scan was personally reviewed and demonstrates no progression of stone with no hydronephrosis.     She will continue to attempt to pass stone and will return in 1 month with further imaging. .    Will give her one last chance to pass the stone and she swears that she will inform us if symptoms become more present.       ROS   Review of systems is negative except for HPI.    No past medical history on file.    Past Surgical History:   Procedure Laterality Date     CARPAL TUNNEL RELEASE       FACIAL COSMETIC SURGERY       REDUCTION MAMMAPLASTY         Current Outpatient Medications   Medication Sig Dispense Refill     amLODIPine (NORVASC) 2.5 MG tablet Take 1 tablet (2.5 mg total) by mouth daily. 30 tablet 11     aspirin 81 MG EC tablet Take 81 mg by mouth daily.       calcium citrate/vitamin D3 (CALCIUM CITRATE + D ORAL) Take by mouth.       celecoxib (CELEBREX) 100 MG capsule Take 100 mg by mouth 2 (two) times a day.       denosumab 60 mg/mL Syrg Inject 60 mg under the skin once. December 30th       meclizine (ANTIVERT) 25 mg tablet Take  1 tablet (25 mg total) by mouth 4 (four) times a day as needed for dizziness. 28 tablet 0     omega-3/dha/epa/dpa/fish oil (OMEGA-3 2100 ORAL) Take by mouth.       omeprazole (PRILOSEC) 20 MG capsule Take 1 capsule (20 mg total) by mouth daily before breakfast. 30 capsule 1     polyethylene glycol (MIRALAX) 17 gram packet Take 17 g by mouth daily.       potassium citrate (UROCIT-K 15) 15 mEq TbER Take 1 tablet by mouth 3 (three) times a day. 180 tablet 4     pseudoephedrine-guaifenesin (MUCINEX D)  mg per tablet Take 1 tablet by mouth every 12 (twelve) hours. 90 tablet 0     rosuvastatin (CRESTOR) 5 MG tablet Take 1 tablet (5 mg total) by mouth daily. 90 tablet 0     timoloL maleate (TIMOPTIC) 0.5 % ophthalmic solution 1 drop 2 (two) times a day.       UNABLE TO FIND Med Name: multi collagen joint mobility       No current facility-administered medications for this visit.        Allergies   Allergen Reactions     Atorvastatin Calcium Unknown     Cerivastatin Sodium Unknown     Doxycycline Unknown     Fosamax [Alendronate] Unknown     Esophagea stricture     Pravastatin Unknown     Rosuvastatin Calcium Unknown       Social History     Socioeconomic History     Marital status:      Spouse name: Not on file     Number of children: Not on file     Years of education: Not on file     Highest education level: Not on file   Occupational History     Not on file   Social Needs     Financial resource strain: Not on file     Food insecurity     Worry: Not on file     Inability: Not on file     Transportation needs     Medical: Not on file     Non-medical: Not on file   Tobacco Use     Smoking status: Never Smoker     Smokeless tobacco: Never Used   Substance and Sexual Activity     Alcohol use: Never     Frequency: Never     Drug use: Never     Sexual activity: Not on file   Lifestyle     Physical activity     Days per week: Not on file     Minutes per session: Not on file     Stress: Not on file   Relationships      Social connections     Talks on phone: Not on file     Gets together: Not on file     Attends Worship service: Not on file     Active member of club or organization: Not on file     Attends meetings of clubs or organizations: Not on file     Relationship status: Not on file     Intimate partner violence     Fear of current or ex partner: Not on file     Emotionally abused: Not on file     Physically abused: Not on file     Forced sexual activity: Not on file   Other Topics Concern     Not on file   Social History Narrative    She is  (1951) and her  has delusional dementia and parkinson's disease and started the [facility] at .  They have 3 sons, 6 grandchildren.  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       Family History   Problem Relation Age of Onset     Heart disease Mother          age 78 of MI     Heart disease Father      Prostate cancer Father          age 86     Mental illness Sister      Heart disease Brother        Objective:      No vitals or physical exam obtained due to virtual visit  Labs   Urinalysis POC (Office):  Nitrite, UA   Date Value Ref Range Status   2021 Negative Negative Final   12/15/2020 Negative Negative Final   2020 Negative Negative Final       Lab Urinalysis:  Blood, UA   Date Value Ref Range Status   2021 Negative Negative Final   12/15/2020 Small (!) Negative Final   2020 Trace (!) Negative Final     Nitrite, UA   Date Value Ref Range Status   2021 Negative Negative Final   12/15/2020 Negative Negative Final   2020 Negative Negative Final     Leukocytes, UA   Date Value Ref Range Status   2021 Small (!) Negative Final   12/15/2020 Trace (!) Negative Final   2020 Negative Negative Final     pH, UA   Date Value Ref Range Status   2021 6.0 4.5 - 8.0 Final   12/15/2020 6.5 5.0 - 8.0 Final   2020 6.0 5.0 - 8.0 Final    and Acute Labs   Urine Culture    Culture    Date Value Ref Range Status   02/12/2021 No Growth  Final   12/15/2020 No Growth  Final   09/02/2020 No Growth  Final

## 2021-06-17 NOTE — TELEPHONE ENCOUNTER
New Appointment Needed  What is the reason for the visit:  pre op - knee surgery - 6/14 - [facility] surgery [place] in [place]  - Dr. Mondragon with  Ortho in [place]   Pre-Op Appt Request  When is the surgery? :  6/14/21  Where is the surgery?:   [facility] surgery center in [place]  Who is the surgeon? :  Dr. Mondragon with [facility]  What type of surgery is being done?: knee surgery  Provider Preference: PCP or Dr. Olmos only  How soon do you need to be seen?: Patient stated the pre op has to be scheduled for the week of 5/24/21  Waitlist offered?: No  Okay to leave a detailed message:  Yes

## 2021-06-17 NOTE — PROGRESS NOTES
Assessment/Plan:    Assessment & Plan   Shelbie was seen today for met follow up.    Diagnoses and all orders for this visit:    Calculus of ureter  -     Patient Stated Goal: Know what to expect after surgery  -     Ureteroscopy Education  -     Case Request: CYSTOURETEROSCOPY, WITH RETROGRADE PYELOGRAM, HOLMIUM LASER LITHOTRIPSY OF URETERAL CALCULUS, AND STENT INSERTION; Standing  -     Case Request: CYSTOURETEROSCOPY, WITH RETROGRADE PYELOGRAM, HOLMIUM LASER LITHOTRIPSY OF URETERAL CALCULUS, AND STENT INSERTION    Calculus of kidney        Stone Management Plan  [facility] Stone Management 3/4/2021 4/15/2021 5/20/2021   Urinary Tract Infection No suspicion of infection - No suspicion of infection   Renal Colic Well controlled symptoms - Asymptomatic at this time   Renal Failure No suspicion of renal failure - No suspicion of renal failure   Current CT date 3/3/2021 4/14/2021 5/19/2021   Right sided stones? Yes Yes Yes   R Number of ureteral stones 1 1 1   R GSD of ureteral stones 4 4 4   R Location of ureteral stone Distal Distal Distal   R Number of kidney stones  2 2 2   R GSD of kidney stones < 2 < 2 < 2   R Hydronephrosis None None None   R Stone Event Established event Established event Established event   Diagnosis date - - -   Initial location of primary symptomatic stone - - -   Initial GSD of primary symptomatic stone - - -   R MET status No progression No progression Failure   Failure - - Adequate duration   R Current Plan MET MET Clear   MET - - -   Clear rationale - - MET failure   Observe rationale - - -   Left sided stones? Yes Yes Yes   L Number of ureteral stones No ureteral stones No ureteral stones No ureteral stones   L Number of kidney stones  1 1 Renal stones unchanged from last exam   L GSD of kidney stones < 2 < 2 -   L Hydronephrosis None None -   L Stone Event No current event No current event No current event   Diagnosis date - - -   Initial location of primary symptomatic stone - - -    Initial GSD of primary symptomatic stone - - -   Resolved date - - -   Post-op status - - -   L Current Plan Observe - -   Clear rationale - - -   Observe rationale Limited stone burden with good prognosis for spontaneous passage - -             PLAN    No follow-ups on file.    Phone call duration: 10 minutes  20 minutes spent on the date of the encounter doing chart review, history and exam, documentation and further activities per the note    Shaan Hinkle MD  [facility]    Subjective:      HPI  Ms. Shelbie Doyle is a 80 y.o.  female who is being evaluated via a billable telephone visit by [facility] for medical expulsive therapy follow up.     On last encounter, her 4 mm stone was in right distal ureter with no hydronephrosis. She has had no unanticipated events.    Symptoms have been minimal . She is asymptomatic at present. She denies symptoms of fever, chills, flank pain, nausea, vomiting, urinary frequency and dysuria. She is planning knee replacement surgery.    CT demonstrates stable 4 mm right distal stone and some additional renal stones. 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 On her most recent scan, there was some concerns from radiologist regarding lower chest nodules and apparently there are plans for dedicated CT imaging this weekend.     She has failed adequate duration of medical expulsive therapy and will proceed to the operating room for ureteroscopic stone clearance. Risks and benefits were detailed of ureteroscopic stone clearance including potential issues of urinary or systemic infection, ureteral injury, inaccessible stone, incomplete stone clearance, multiple surgeries, and stent related symptoms of urgency, frequency and hematuria.    She has a pre-op for the knee procedure set up with Dr Garcia next week. We should get the stones cleared before the knee surgery. Hopefully the chest  findings are inconsequential but she should have the stones cleared before any chest intervention as well.       ROS   Review of systems is negative except for HPI.    No past medical history on file.    Past Surgical History:   Procedure Laterality Date     CARPAL TUNNEL RELEASE       FACIAL COSMETIC SURGERY       REDUCTION MAMMAPLASTY         Current Outpatient Medications   Medication Sig Dispense Refill     aspirin 81 MG EC tablet Take 81 mg by mouth daily.       calcium citrate/vitamin D3 (CALCIUM CITRATE + D ORAL) Take by mouth.       celecoxib (CELEBREX) 100 MG capsule Take 100 mg by mouth 2 (two) times a day.       denosumab 60 mg/mL Syrg Inject 60 mg under the skin once. December 30th       omega-3/dha/epa/dpa/fish oil (OMEGA-3 2100 ORAL) Take by mouth.       polyethylene glycol (MIRALAX) 17 gram packet Take 17 g by mouth daily.       potassium citrate (UROCIT-K 15) 15 mEq TbER Take 1 tablet by mouth 3 (three) times a day. 180 tablet 4     pseudoephedrine-guaifenesin (MUCINEX D)  mg per tablet Take 1 tablet by mouth every 12 (twelve) hours. 90 tablet 0     rosuvastatin (CRESTOR) 5 MG tablet Take 1 tablet (5 mg total) by mouth daily. 90 tablet 0     timoloL maleate (TIMOPTIC) 0.5 % ophthalmic solution 1 drop 2 (two) times a day.       UNABLE TO FIND Med Name: multi collagen joint mobility       No current facility-administered medications for this visit.        Allergies   Allergen Reactions     Atorvastatin Calcium Unknown     Cerivastatin Sodium Unknown     Doxycycline Unknown     Fosamax [Alendronate] Unknown     Esophagea stricture     Pravastatin Unknown     Rosuvastatin Calcium Unknown       Social History     Socioeconomic History     Marital status:      Spouse name: Not on file     Number of children: Not on file     Years of education: Not on file     Highest education level: Not on file   Occupational History     Not on file   Social Needs     Financial resource strain: Not on file      Food insecurity     Worry: Not on file     Inability: Not on file     Transportation needs     Medical: Not on file     Non-medical: Not on file   Tobacco Use     Smoking status: Never Smoker     Smokeless tobacco: Never Used   Substance and Sexual Activity     Alcohol use: Never     Frequency: Never     Drug use: Never     Sexual activity: Not on file   Lifestyle     Physical activity     Days per week: Not on file     Minutes per session: Not on file     Stress: Not on file   Relationships     Social connections     Talks on phone: Not on file     Gets together: Not on file     Attends Scientologist service: Not on file     Active member of club or organization: Not on file     Attends meetings of clubs or organizations: Not on file     Relationship status: Not on file     Intimate partner violence     Fear of current or ex partner: Not on file     Emotionally abused: Not on file     Physically abused: Not on file     Forced sexual activity: Not on file   Other Topics Concern     Not on file   Social History Narrative    She is  (1951) and her  has delusional dementia and parkinson's disease and started the [facility] [facility] at .  They have 3 sons, 6 grandchildren.  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       Family History   Problem Relation Age of Onset     Heart disease Mother          age 78 of MI     Heart disease Father      Prostate cancer Father          age 86     Mental illness Sister      Heart disease Brother        Objective:      No vitals or physical exam obtained due to virtual visit  Labs   Urinalysis POC (Office):  Nitrite, UA   Date Value Ref Range Status   2021 Negative Negative Final   12/15/2020 Negative Negative Final   2020 Negative Negative Final       Lab Urinalysis:  Blood, UA   Date Value Ref Range Status   2021 Negative Negative Final   12/15/2020 Small (!) Negative Final   2020 Trace (!) Negative Final      Nitrite, UA   Date Value Ref Range Status   02/12/2021 Negative Negative Final   12/15/2020 Negative Negative Final   11/24/2020 Negative Negative Final     Leukocytes, UA   Date Value Ref Range Status   02/12/2021 Small (!) Negative Final   12/15/2020 Trace (!) Negative Final   11/24/2020 Negative Negative Final     pH, UA   Date Value Ref Range Status   02/12/2021 6.0 4.5 - 8.0 Final   12/15/2020 6.5 5.0 - 8.0 Final   11/24/2020 6.0 5.0 - 8.0 Final    and Acute Labs   Renal Panel  [facility]  Creatinine   Date Value Ref Range Status   01/20/2021 0.64 0.60 - 1.10 mg/dL Final   11/24/2020 0.62 0.60 - 1.10 mg/dL Final   10/30/2020 0.70 0.60 - 1.10 mg/dL Final     Potassium   Date Value Ref Range Status   01/20/2021 4.1 3.5 - 5.0 mmol/L Final   11/24/2020 3.7 3.5 - 5.0 mmol/L Final   10/30/2020 3.7 3.5 - 5.0 mmol/L Final     Calcium   Date Value Ref Range Status   01/20/2021 8.9 8.5 - 10.5 mg/dL Final   11/24/2020 9.0 8.5 - 10.5 mg/dL Final   10/30/2020 8.7 8.5 - 10.5 mg/dL Final    and Urine Culture    Culture   Date Value Ref Range Status   02/12/2021 No Growth  Final   12/15/2020 No Growth  Final   09/02/2020 No Growth  Final

## 2021-06-17 NOTE — PROGRESS NOTES
Patient is roomed via telephone for a virtual visit.  She is being seen for a 4 weeks ureteral stone follow up with a CT scan.  Patient confirmed she is in the [facility] at the time of this appointment.

## 2021-06-17 NOTE — TELEPHONE ENCOUNTER
Called and scheduled CT for Sunday 5/23 in AM pt unable to make that appt - Radioplogy scheduling to call pt to schedule   Pt will discuss rest with Dr Garcia at Pre op 5/25

## 2021-06-17 NOTE — TELEPHONE ENCOUNTER
Pt already informed and pulmonologist number given to pt to call    Pt is seeing pcp today for pre op

## 2021-06-17 NOTE — PATIENT INSTRUCTIONS - HE
Patient Stated Goal: Know what to expect after surgery  Ureteroscopy    Ureteroscopy is a procedure which is done for clearance of stones from the ureter, kidney or both. There are no incisions involved. The procedure involves your surgeon placing a small scope into your urethra. This is the opening where urine leaves your body.  The surgeon watches as they carefully guide the scope to the stone(s).  Modern flexible ureteroscopes can be used to reach virtually any location within the urinary tract.     The size, shape and location of the stone determines how best to treat the stone(s).  Whenever possible, stones are removed in one piece.  Larger stones need to be broken using a laser before removing in smaller pieces.  The goal is to remove all stones and stone fragments from that side of the body in a single treatment.  Complete stone clearance is an important step to prevent future kidney stone episodes.    Surgery:    Same day outpatient procedure    30-60 minutes    Procedure done in hospital surgical suite    General anesthesia (you will be asleep during the procedure)     Antibiotic prior to surgery to prevent infection    Physician will visit with you and respond to any questions or concerns and consent will be signed prior to going to the operating room    Risks:    Infection - Preoperative antibiotics should prevent new infections but it is possible that unanticipated bacteria may be introduced at time of surgery or that the stones were actually chronically infected before surgery      Injury - The ureter may be injured during this procedure.  This is most likely to happen if the ureter was very inflamed before surgery or if a stone is very tightly impacted.  The surgeon will not aggressively treat a stone if this creates a risk of injury.        Inaccessible Stones -A single procedure is effective in 95% of cases, but if your ureter is very narrow or your kidney stone is very impacted, a stent will be  placed and the procedure stopped.  In 1-2 weeks after the ureter has relaxed, the patient will be brought back to surgery and the procedure can be safely performed.      Incomplete stone clearance -Occasionally stone or stone fragments may not be completely cleared.  These may pass on their own, which may cause discomfort.  Our goal is to remove all possible stones and fragments.    Stent:      An internal soft tube will be placed between the kidney and the bladder while in surgery (after the stone is cleared). The stent will keep the kidney draining.    What should I expect?     It is common for a stent to cause some irritation and discomfort.   You may have:      The need to urinate suddenly     The need to urinate often     Pain during urination     A dull backache, which may get worse during urination     Blood stained urine (like fruit punch) and occasional small clots    It s important to remember the stent is necessary and only temporary. To feel more comfortable:      Drink more than you normally would but you do not have to constantly  flush your kidneys     Limiting your activity may decrease irritation or bleeding    Ibuprofen - 2 tablets every 6-8 hours     Use pain medications as directed.    When is the stent removed?    Most stents are removed within 5 days to 2 weeks after a procedure.     How is the stent removed?     Your stent will be removed in the [facility] with a small telescope and a grasping tool.  It usually takes less than 1 minute to remove the stent.    What should I expect after the stent is removed?     You should feel normal by the next day    Some patients find:    An increase in back pain about an hour after the stent is removed as the kidney fills up with urine before it starts to empty.  It can be as uncomfortable as your initial stone episode.  Taking pain medications before stent removal may be helpful, but you would need someone else to drive you to and from your  appointment.    Bladder symptoms usually disappear by the next morning.    Small amounts of blood in the urine may be seen occasionally for up to a week.    Diet:      After surgery, there are no dietary restrictions - Drink to thirst, there is no need to increase intake of fluids, as this may increase nausea symptoms. Try to eat smaller, more frequent snacks, instead of large meals.    Activity:    Many people return to work within 1-2 days. Fatigue is normal for a couple of weeks following surgery. With increased activity you may experience more discomfort and you may notice more blood in your urine.      Post-Operative Symptom Control    While you recover from your procedure, you can take steps to ease your recovery.    Medications that prevent further episodes of severe pain and help stones pass: Take these as prescribed on a regular basis even if you are NOT in pain      Ibuprofen (Advil or Motrin) - Is available over the counter Take 2 (200mg) tablets every 6 hours until the stone passes.  o prevents spasm of the ureter.    o Decreases pain      Dramamine - (drowsy version, non-generic formulation) Is available over the counter and decreases spasm of the ureter.  Take 50mg at bedtime every night until the stone passes. In addition, take every 6 hours as needed.  Dramamine:  o Decreases nausea  o Decreases acute pain  o Decreases recurrence of pain for next 24 hours  o Will help you sleep        *This medication will cause increased drowsiness, do not drive or operate machinery for 6 hours      Flomax- Studies show that Flomax decreases irritation from stents.   o Take every day with food until stone passes even if you do not have pain  o Flomax does not relieve pain.        *This medication may cause nasal congestion or light-headedness      Detrol ( Tolterodine) - After surgery Detrol may decrease stent irritation and pelvic pain  o Take as prescribed     *This medication may cause dry mouth, constipation or  blurry vision. Stop medication if unable to urinate.    Medication that are taken as needed to manage break through symptoms: Take these ONLY as required and hopefully not at all      Narcotics (Percocet, Vicodin, Dilaudid)- take as prescribed for severe pain unrelieved by ibuprofen and dramamine  o Take as prescribed for severe pain  o Narcotics have significant side effects and only  cover-up  pain. They have no effect on cause of pain.  o Common side effects:  - Confusion, disorientation and sedation - DO NOT DRIVE OR OPERATE MACHINERY WITHIN 24 HOURS  - Nausea - take Dramamine or Zofran  or Haldol to help control  - Constipation  - Sleep disturbances      Ondansetron (Zofran)-  o Take as prescribed  o Reserve for severe nausea  o May cause constipation, start over the counter Miralax if needed to treat this    Haldol-  o Take as prescribed  o Reserve for severe nausea    Warning Signs/Symptoms - Please call the [facility] 24 hours a day at [phone] IMMEDIATELY if you experience any of these:    Fever greater than 100.1     Chills    Pain NOT CONTROLLED by pain medications    Heavy bleeding or large clots in urine (small clots can be normal)    Persistent nausea and/or vomiting    Post-Operative Follow up:    The stone(s) will be sent from surgery to a lab for composition analysis.  These results are usually available before a one month post-operative visit.  If you had laser treatment to break up your stone, you will usually be scheduled for a low dose CT scan prior to your one month appointment.  This scan allows your surgeon to confirm that all stone fragments were cleared at time of surgery and that there have been no complications.  These results along with possible labs and urine studies will help us develop an individualized plan to prevent new stones from forming and keep existing stones from enlarging.  This visit is usually scheduled about 1 month after your original surgery.    The  [facility] can respond to your questions or concerns 24 hours a day at [phone].

## 2021-06-17 NOTE — TELEPHONE ENCOUNTER
----- Message from Maria T Olmos MD sent at 5/24/2021  6:41 PM CDT -----  Please let her that she appears to have a slow growing lung infection that is getting worse . It is atypical and will most likely require special antibiotics . Recommend pulmonary  referal urgent and review by Dr Garcia

## 2021-06-18 NOTE — PATIENT INSTRUCTIONS - HE
Patient Instructions by Wendy Garcia MD at 9/2/2020  9:00 AM     Author: Wendy Garcia MD Service: -- Author Type: Physician    Filed: 9/2/2020 10:03 AM Encounter Date: 9/2/2020 Status: Signed    : Wendy Garcia MD (Physician)         Patient Education     Exercise for a Healthier Heart  You may wonder how you can improve the health of your heart. If youre thinking about exercise, youre on the right track. You dont need to become an athlete, but you do need a certain amount of brisk exercise to help strengthen your heart. If you have been diagnosed with a heart condition, your doctor may recommend exercise to help stabilize your condition. To help make exercise a habit, choose safe, fun activities.       Be sure to check with your health care provider before starting an exercise program.    Why exercise?  Exercising regularly offers many healthy rewards. It can help you do all of the following:    Improve your blood cholesterol levels to help prevent further heart trouble    Lower your blood pressure to help prevent a stroke or heart attack    Control diabetes, or reduce your risk of getting this disease    Improve your heart and lung function    Reach and maintain a healthy weight    Make your muscles stronger and more limber so you can stay active    Prevent falls and fractures by slowing the loss of bone mass (osteoporosis)    Manage stress better  Exercise tips  Ease into your routine. Set small goals. Then build on them.  Exercise on most days. Aim for a total of 150 or more minutes of moderate to  vigorous intensity activity each week. Consider 40 minutes, 3 to 4 times a week. For best results, activity should last for 40 minutes on average. It is OK to work up to the 40 minute period over time. Examples of moderate-intensity activity is walking one mile in 15 minutes or 30 to 45 minutes of yard work.  Step up your daily activity level. Along with your exercise program, try being more  active throughout the day. Walk instead of drive. Do more household tasks or yard work.  Choose one or more activities you enjoy. Walking is one of the easiest things you can do. You can also try swimming, riding a bike, or taking an exercise class.  Stop exercising and call your doctor if you:    Have chest pain or feel dizzy or lightheaded    Feel burning, tightness, pressure, or heaviness in your chest, neck, shoulders, back, or arms    Have unusual shortness of breath    Have increased joint or muscle pain    Have palpitations or an irregular heartbeat      0526-8496 [employer]. [address] [place] [address] . All rights reserved. This information is not intended as a substitute for professional medical care. Always follow your healthcare professional's instructions.         Patient Education   Understanding [employer] MyPlate  The USDA (US Department of Agriculture) has guidelines to help you make healthy food choices. These are called MyPlate. MyPlate shows the food groups that make up healthy meals using the image of a place setting. Before you eat, think about the healthiest choices for what to put onto your plate or into your cup or bowl. To learn more about building a healthy plate, visit www.choosemyplate.gov.       The Food Groups    Fruits: Any fruit or 100% fruit juice counts as part of the Fruit Group. Fruits may be fresh, canned, frozen, or dried, and may be whole, cut-up, or pureed. Make half your plate fruits and vegetables.    Vegetables: Any vegetable or 100% vegetable juice counts as a member of the Vegetable Group. Vegetables may be fresh, frozen, canned, or dried. They can be served raw or cooked and may be whole, cut-up, or mashed. Make half your plate fruits and vegetables.     Grains: All foods made from grains are part of the Grains Group. These include wheat, rice, oats, cornmeal, and barley such as bread, pasta, oatmeal, cereal, tortillas, and grits. Grains should be  no more than a quarter of your plate. At least half of your grains should be whole grains.    Protein: This group includes meat, poultry, seafood, beans and peas, eggs, processed soy products (like tofu), nuts (including nut butters), and seeds. Make protein choices no more than a quarter of your plate. Meat and poultry choices should be lean or low fat.    Dairy: All fluid milk products and foods made from milk that contain calcium, like yogurt and cheese are part of the Dairy Group. (Foods that have little calcium, such as cream, butter, and cream cheese, are not part of the group.) Most dairy choices should be low-fat or fat-free.    Oils: These are fats that are liquid at room temperature. They include canola, corn, olive, soybean, and sunflower oil. Foods that are mainly oil include mayonnaise, certain salad dressings, and soft margarines. You should have only 5 to 7 teaspoons of oils a day. You probably already get this much from the food you eat.  Use [employer] to Help Build Your Meals  The [employer] can help you plan and track your meals and activity. You can look up individual foods to see or compare their nutritional value. You can get guidelines for what and how much you should eat. You can compare your food choices. And you can assess personal physical activities and see ways you can improve. Go to www.[employer]summit[employer].gov/supertracker/.    7888-8070 [employer]. [address] [place] [address]. All rights reserved. This information is not intended as a substitute for professional medical care. Always follow your healthcare professional's instructions.           Patient Education   Signs of Hearing Loss  Hearing loss is a problem shared by many people. In fact, it is one of the most common health conditions, particularly as people age. Most people over age 65 have some hearing loss, and by age 80, almost everyone does. Because hearing loss usually occurs slowly over the years,  you may not realize your hearing ability has gotten worse.       Have your hearing checked  Contact your [facility] care provider if you:    Have to strain to hear normal conversation.    Have to watch other peoples faces very carefully to follow what theyre saying.    Need to ask people to repeat what theyve said.    Often misunderstand what people are saying.    Turn the volume of the television or radio up so high that others complain.    Feel that people are mumbling when theyre talking to you.    Find that the effort to hear leaves you feeling tired and irritated.    Notice, when using the phone, that you hear better with 1 ear than the other.    3518-8509 [employer]. [address] [place] [address], [place] [address]. All rights reserved. This information is not intended as a substitute for professional medical care. Always follow your healthcare professional's instructions.         Patient Education   Urinary Incontinence, Female (Adult)  Urinary incontinence means loss of control of the bladder. This problem affects many women, especially as they get older. If you have incontinence, you may be embarrassed to ask for help. But know that this problem can be treated.  Types of Incontinence  There are different types of incontinence. Two of the main types are described here. You can have more than one type.    Stress incontinence. With this type, urine leaks when pressure (stress) is put on the bladder. This may happen when you cough, sneeze, or laugh. Stress incontinence most often occurs because the pelvic floor muscles that support the bladder and urethra are weak. This can happen after pregnancy and vaginal childbirth or a hysterectomy. It can also be due to excess body weight or hormone changes.    Urge incontinence (also called overactive bladder). With this type, a sudden urge to urinate is felt often. This may happen even though there may not be much urine in the bladder. The need to urinate often  during the night is common. Urge incontinence most often occurs because of bladder spasms. This may be due to bladder irritation or infection. Damage to bladder nerves or pelvic muscles, constipation, and certain medicines can also lead to urge incontinence.  Treatment of urinary incontinence depends on the cause. Further evaluation is needed to find the type you have. This will likely include an exam and certain tests. Based on the results, you and your healthcare provider can then plan treatment. Until a diagnosis is made, the home care tips below can help relieve symptoms.  Home care    Do pelvic floor muscle exercises, if they are prescribed. The pelvic floor muscles help support the bladder and urethra. Many women find that their symptoms improve when doing special exercises that strengthen these muscles. To do the exercises contract the muscles you would use to stop your stream of urine, but do this when youre not urinating. Hold for 10 seconds, then relax. Repeat 10 to 20 times in a row, at least 3 times a day. Your provider may give you other instructions for how to do the exercises and how often.    Keep a bladder diary. This helps track how often and how much you urinate over a set period of time. Bring this diary with you to your next visit with the provider. The information can help your provider learn more about your bladder problem.    Lose weight, if advised to by your provider. Excess weight puts pressure on the bladder. Your provider can help you create a weight-loss plan thats right for you. This may include exercising more and making certain diet changes.    Don't consume foods and drinks that may irritate the bladder. These can include alcohol and caffeinated drinks.    Quit smoking. Smoking and other tobacco use can lead to chronic cough that strains the pelvic floor muscles. Smoking may also damage the bladder and urethra. Talk with your provider about treatments or methods you can use to quit  smoking.    If drinking large amounts of fluid causes you to have symptoms, you may be advised to limit your fluid intake. You may also be advised to drink most of your fluids during the day and to limit fluids at night.    If youre worried about urine leakage or accidents, you may wear absorbent pads to catch urine. Change the pads often. This helps reduce discomfort. It may also reduce the risk of skin or bladder infections.  Follow-up care  Follow up with your healthcare provider, or as directed. It may take some to find the right treatment for your problem. Your treatment plan may include special therapies or medicines. Certain procedures or surgery may also be options. Be sure to discuss any questions you have with your provider.  When to seek medical advice  Call the healthcare provider right away if any of these occur:    Fever of 100.4 F (38 C) or higher, or as directed by your provider    Bladder pain or fullness    Abdominal swelling    Nausea or vomiting    Back pain    Weakness, dizziness or fainting  Date Last Reviewed: 10/1/2017    1669-0608 [employer]. [address] [place] [address]. All rights reserved. This information is not intended as a substitute for professional medical care. Always follow your healthcare professional's instructions.       Advance Directive  Patients advance directive was discussed and I am comfortable with the patients wishes.  Patient Education   Personalized Prevention Plan  You are due for the preventive services outlined below.  Your care team is available to assist you in scheduling these services.  If you have already completed any of these items, please share that information with your care team to update in your medical record.  Health Maintenance   Topic Date Due   ? ZOSTER VACCINES (2 of 3) 08/02/2007   ? DXA SCAN  11/20/2019   ? TD 18+ HE  08/02/2021   ? MEDICARE ANNUAL WELLNESS VISIT  09/02/2021   ? FALL RISK ASSESSMENT  09/02/2021   ?  ADVANCE CARE PLANNING  11/20/2022   ? PNEUMOCOCCAL IMMUNIZATION 65+ LOW/MEDIUM RISK  Completed   ? HEPATITIS B VACCINES  Aged Out

## 2021-06-21 NOTE — LETTER
Letter by Wendy Garcia MD at      Author: Wendy Garcia MD Service: -- Author Type: --    Filed:  Encounter Date: 1/20/2021 Status: (Other)         Shelbie Doyle  [address]  [place] [address]             January 20, 2021         Dear MsKvng Doyle,    Below are the results from your recent visit:    Resulted Orders   HM2(CBC w/o Differential)   Result Value Ref Range    WBC 8.2 4.0 - 11.0 thou/uL    RBC 4.30 3.80 - 5.40 mill/uL    Hemoglobin 12.3 12.0 - 16.0 g/dL    Hematocrit 36.2 35.0 - 47.0 %    MCV 84 80 - 100 fL    MCH 28.7 27.0 - 34.0 pg    MCHC 34.0 32.0 - 36.0 g/dL    RDW 12.0 11.0 - 14.5 %    Platelets 284 140 - 440 thou/uL    MPV 7.3 7.0 - 10.0 fL   Basic Metabolic Panel   Result Value Ref Range    Sodium 138 136 - 145 mmol/L    Potassium 4.1 3.5 - 5.0 mmol/L    Chloride 104 98 - 107 mmol/L    CO2 25 22 - 31 mmol/L    Anion Gap, Calculation 9 5 - 18 mmol/L    Glucose 85 70 - 125 mg/dL    Calcium 8.9 8.5 - 10.5 mg/dL    BUN 11 8 - 28 mg/dL    Creatinine 0.64 0.60 - 1.10 mg/dL    GFR MDRD Af Amer >60 >60 mL/min/1.73m2    GFR MDRD Non Af Amer >60 >60 mL/min/1.73m2    Narrative    Fasting Glucose reference range is 70-99 mg/dL per  American Diabetes Association (ADA) guidelines.       Your laboratory studies look excellent!    Please call with questions or contact us using "[employer].    Sincerely,        Electronically signed by Wendy Garcia MD

## 2021-06-23 ENCOUNTER — COMMUNICATION - HEALTHEAST (OUTPATIENT)
Dept: INTERNAL MEDICINE | Facility: CLINIC | Age: 81
End: 2021-06-23

## 2021-06-23 DIAGNOSIS — R11.0 NAUSEA: ICD-10-CM

## 2021-06-23 DIAGNOSIS — K21.00 GASTROESOPHAGEAL REFLUX DISEASE WITH ESOPHAGITIS WITHOUT HEMORRHAGE: ICD-10-CM

## 2021-06-25 NOTE — PROGRESS NOTES
Oxygen saturation walk test    Patient oxygen saturation on RA at rest is 98%.  Oxygen saturation while ambulating 300ft on RA is 95%.      Patient is ambulatory within his/her home.

## 2021-06-25 NOTE — TELEPHONE ENCOUNTER
Called Lisa to review test results.    Negative endemic mycoses serologies  Negative swallow evaluation  Fungal sputum cx in process, AFB smear negative, bacterial specimen inadequate for testing.  She continues to deny respiratory symptoms. She does endorse worsening GERD sxs despite pepcid with refluxing overnight when supine    Discussed possibilities again - inflammatory process in the setting of microaspiration/GERD, indolent atypical infectious (not bacterial), appearance and risk factors less favoring of malignancy.   Next step would be bronchoscopy with BAL, nodules are too small for biopsy. I acknowledged it is possible for this to be non diagnostic as well. She declines to pursue this at this time.   She asked if the imaging may reflect asymptomatic COVID-19 infection - the imaging is not consistent with what is typically seen with COVID, and these faint GG nodules have also been present since at least 9/2020    Recommended more aggressive GERD tx with PPI daily. She was in agreement with this.  Recommended repeat imaging in 6 months. If the findings are increased/worsened, will again recommend diagnostic procedure. She was in agreement with this plan  No contraindication to planned orthopedic procedure this month.      Dee Dee Lo MD  Pulmonary and Critical Care Medicine  [facility]  Office: [phone]

## 2021-06-25 NOTE — TELEPHONE ENCOUNTER
Phone call from [facility] - Jadyn.  Sputum for culture that was dropped off today was not usable.  Too many epithelial cells.  Other cultures are able to be run.  Will need to repeat the sputum for culture.      Notified patient that she will need to re-collect this sputum sample.  Sputum cups mailed to her and she will bring to lab when able to collect a specimen.

## 2021-06-25 NOTE — PROGRESS NOTES
P[facility] Clinic Outpatient Consultation    Assessment and Plan:   80 y F with a history of HLD, GERD, who presents for an evaluation of incidental findings on CT scan of the chest, with bilateral, peripheral LL centrilobular ground glass nodules. She is asymptomatic. These have been present since at least 9/2020.    Differential diagnosis for finding is broad and includes atypical infectious, inflammatory (HP, chronic aspiration pneumonitis), hematogenous spread of malignancy. Basilar predominance makes some of these less likely. She has no symptoms to suggest an advanced malignancy and is up to date on cancer screenings for age. Imaging findings and clinical presentation is no consistent with acute bacterial process.    PLAN:    VFSS evaluation for silent aspiration    Lab serologies for endemic mycoses    Induced sputum for bacterial, fungal, and AFB cultures    No contraindication to proceeding with planned urologic procedure this week. Low risk for post-op pulmonary complications from this incidental imaging finding.      I will call her with the results and review next steps.     Dee Dee Lo MD  Pulmonary and Critical Care Medicine  [facility]  Office: [phone]    ----------------------    Reason for Consult: Abnormal CT chest    HPI:   This is an 80 y F with a history of HLD, GERD, who presents for an evaluation of incidental findings on CT scan of the chest. She has bilateral, peripheral LL centrilobular ground glass nodules, which are present to some extent on CT scan A/P dating back to Sept 2020, better visualized on the recent high resolution CT chest.    She denies shortness of breath, good exertional abilities. Denies cough. Has long standing nasal congestion wit post nasal drip that was previously evaluated. Minimal tobacco history, with sporadic smoking in college for 4 years, quit in 1960. No known personal, or family history of respiratory disorders.     No reported dysphagia, coughing  with oral intake. Has GERD, and small esophageal hiatal hernia on imaging.     No known exposures throughout her life - mold, birds, farming. No pets. Worked in interior design. No known TB contacts, no identified risk factors. Reports she is up to date on all routine cancer screenings.    No fevers, chills, weight loss, night sweats.    ROS:  A 12-system review was obtained and was negative with the exception of the symptoms endorsed in the history of present illness.    PMH:  GERD  HLD    PSH:  Past Surgical History:   Procedure Laterality Date     CARPAL TUNNEL RELEASE       FACIAL COSMETIC SURGERY       REDUCTION MAMMAPLASTY       Allergies:  Allergies   Allergen Reactions     Atorvastatin Calcium Unknown     Cerivastatin Sodium Unknown     Doxycycline Unknown     Fosamax [Alendronate] Unknown     Esophagea stricture     Pravastatin Unknown     Family HX:  Family History   Problem Relation Age of Onset     Heart disease Mother          age 78 of MI     Heart disease Father      Prostate cancer Father          age 86     Mental illness Sister      Heart disease Brother    No ling issues    Social Hx:  Social History     Socioeconomic History     Marital status:      Spouse name: Not on file     Number of children: Not on file     Years of education: Not on file     Highest education level: Not on file   Occupational History     Not on file   Social Needs     Financial resource strain: Not on file     Food insecurity     Worry: Not on file     Inability: Not on file     Transportation needs     Medical: Not on file     Non-medical: Not on file   Tobacco Use     Smoking status: Never Smoker     Smokeless tobacco: Never Used   Substance and Sexual Activity     Alcohol use: Never     Frequency: Never     Drug use: Never     Sexual activity: Not on file   Lifestyle     Physical activity     Days per week: Not on file     Minutes per session: Not on file     Stress: Not on file   Relationships     Social  "connections     Talks on phone: Not on file     Gets together: Not on file     Attends [employer] service: Not on file     Active member of club or organization: Not on file     Attends meetings of clubs or organizations: Not on file     Relationship status: Not on file     Intimate partner violence     Fear of current or ex partner: Not on file     Emotionally abused: Not on file     Physically abused: Not on file     Forced sexual activity: Not on file   Other Topics Concern     Not on file   Social History Narrative    She is  (1951) and her  has delusional dementia and parkinson's disease and started the [facility] at .  They have 3 sons, 6 grandchildren.  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     Current Meds:  Reviewed    Physical Exam:  /82   Pulse 69   Ht 5' 2\" (1.575 m)   Wt 122 lb 4.8 oz (55.5 kg)   SpO2 98% Comment: RA  BMI 22.37 kg/m    Gen: Alert, oriented, no distress  HEENT: nasal turbinates are unremarkable, no oropharyngeal lesions, no cervical or supraclavicular lymphadenopathy, no thyromegaly  CV: RRR, no M/G/R  Resp: CTAB, no focal crackles or wheezes  Abd: soft, nontender, no palpable organomegaly  Skin: no apparent rashes  Ext: no cyanosis, clubbing or edema  Neuro: alert, nonfocal    Labs:  Reviewed    Imaging studies:  Personally reviewed and interpreted:  Multiple CT scans reviewed in detail    5/23/21 High Res CT Chest:  FINDINGS:   LUNGS AND PLEURA: There are innumerable tiny faint centrilobular nodules in the periphery of the lungs and in a lower lung distribution. All of the nodules measure less than 5 mm in diameter and many of the nodules have central low-attenuation. There has   been a slow interval increase in nodules going back to 2/16/2021. No focal area of consolidation or dominant pulmonary nodule. Single tiny benign calcified granuloma left upper lobe. Mild amount of air trapping in the upper lungs. No pleural fluid. "   Normal airways.     MEDIASTINUM/AXILLAE: There is a small sliding esophageal hiatal hernia. Atheromatous changes in a normal caliber thoracic aorta. No adenopathy.     CORONARY ARTERY CALCIFICATION: None.     UPPER ABDOMEN: Small low-attenuation lesions in the liver likely represent cysts.     MUSCULOSKELETAL: Degenerative disc disease throughout the spine.     IMPRESSION:   1.  Interval increase in tiny faint centrilobular nodules greater in the lower lungs and a few of which have central low-attenuation. Findings are nonspecific. Differential includes infections such as infectious bronchiolitis and mycobacterial infections   including atypical mycobacterial infection. An inflammatory process such as aspiration pneumonitis is a possibility. Less likely possibilities include hypersensitivity pneumonitis and smoking-related processes such as respiratory bronchiolitis or   Langerhans' cell granulomatosis, all of which have an upper lung predisposition.      PFT's  None

## 2021-06-25 NOTE — PROGRESS NOTES
Spoke with patient who removed her stent last night without difficulty.  She is feeling well today and is having no issues.  She will call as needed and is set up for her one month f/u.  Laura Love RN

## 2021-06-25 NOTE — TELEPHONE ENCOUNTER
Called Shelbie to let her know that the sputum sample she dropped off at the lab is no good and that she would need to drop off another one. She said this was her third try and she is not going to do it again.

## 2021-06-25 NOTE — TELEPHONE ENCOUNTER
Scheduled for surgery 5-28-21 to remove kidney stone.      Tonight she had two episodes of very bad low abdomoinal pain, and now she has no pain at all, and is not feeling the pressure she was having before.    She is wondering if she has passed the stone, but she hasn't seen it.  She is wondering if she should get another CT scan before having surgery, or if she should cancel her surgery.    Please call patient in the morning at [phone].      Reason for Disposition    [1] Caller requesting NON-URGENT health information AND [2] PCP's office is the best resource    Protocols used: INFORMATION ONLY CALL - NO TRIAGE-A-

## 2021-06-25 NOTE — PROGRESS NOTES
Preoperative Exam    Scheduled Procedure: CYSTOURETEROSCOPY, WITH RETROGRADE PYELOGRAM, HOLMIUM LASER LITHOTRIPSY OF URETERAL CALCULUS, AND STENT INSERTION and (L)TKA  Surgery Date:   5/28/21 and 6/14/21  Surgery Location: [facility], fax [phone]  And [facility] [phone]  Surgeon:  Dr Hinkle and Dr Mondragon    Assessment/Plan:     1. Pre-op exam  She aims medically stable for surgery to extract multiple kidney stones.  She is seeing pulmonary tomorrow for a new and incidental diagnosis of groundglass opacities seen on CT scan.  No hypoxemia, cough or lack of wellbeing.  Will await final pulmonary recommendation.  She denies untoward reaction to local or general anesthetic agents.  She has no history of bleeding or procoagulant disorder.  She has no history of primary coronary artery disease, arrhythmia, chronic kidney disease or diabetes.  The new lung problem is as noted above.    She was advised to withhold aspirin, fish oil and nonsteroidal anti-inflammatory agents for 7 to 10 days prior to her procedure.    Postoperative care will occur with her son and family members.    - Electrocardiogram Perform and Read-personally reviewed.  Normal sinus rhythm with no acute abnormalities  - HM2(CBC w/o Differential)  - Basic Metabolic Panel  - Urinalysis-UC if Indicated; Future    2. Renal lithiasis  As above-multiple kidney stones.  Surgery recommended  - Electrocardiogram Perform and Read    3. Ground glass opacity present on imaging of lung  Incidental diagnosis of groundglass opacities on lung scan from March-2021.  No new exposures/illnesses.  Previous nitrofurantoin use  Pulmonary consult in the a.m.      Surgical Procedure Risk: Low (reported cardiac risk generally < 1%)  Have you had prior anesthesia?: Yes  Have you or any family members had a previous anesthesia reaction:  Yes: Mother and Son - Hypotension  Do you or any family members have a history of a clotting or bleeding disorder?:  No  Cardiac Risk Assessment: no increased risk for major cardiac complications    Pending review of diagnostic evaluation taking place tomorrow, APPROVAL GIVEN to proceed with proposed procedure, without further diagnostic evaluation.    Functional Status: Independent  Patient plans to recover at home with family.     Subjective:      Shelbie Doyle is a 80 y.o. female who presents for a preoperative consultation.     HPI related to upcoming procedure: She is a delightful 80-year-old female who is here today for preoperative evaluation prior to having another kidney stone extraction procedure.  She has had multiple kidney stones that have caused pelvic pain and pressure.  She is looking forward to having resolution of this problem.    Of note, she has had multiple recent CT scans of the abdomen.  In March, it was noted that there were some groundglass opacities noted at the lung base.  A dedicated CT scan of the chest was performed recently showing increase in prominence of these opacities.  The patient denies any recent pulmonary symptoms.  She denies any history of recent smoking, environmental exposure, aspiration, or reflux.  She has chronic postnasal drip.    Pertinent anesthesia history is reviewed: She denies any untoward reaction to local or general anesthetic agents.  She does not have any bleeding or procoagulant disorder.  She is functionally active and able to achieve greater than 4 METS of activity.  She does not have primary coronary artery disease or arrhythmia.  She does not have chronic kidney disease or diabetes.  She is a non-smoker.    Preop Questions 5/25/2021   Have you ever had a heart attack or stroke? No   Have you ever had surgery on your heart or blood vessels, such as a stent placement, a coronary artery bypass, or surgery on an artery in your head, neck, heart, or legs? No   Do you have chest pain with activity? No   Do you have a history of  heart failure? No   Do you currently  have a cold, bronchitis or symptoms of other infection? YES - Sinusitis symptoms-chronic   Do you have a cough, shortness of breath, or wheezing? No   Do you or anyone in your family have previous history of blood clots? No   Do you or does anyone in your family have a serious bleeding problem such as prolonged bleeding following surgeries or cuts? No   Have you ever had problems with anemia or been told to take iron pills? No   Have you had any abnormal blood loss such as black, tarry or bloody stools, or abnormal vaginal bleeding? No   Have you ever had a blood transfusion? No   Are you willing to have a blood transfusion if it is medically needed before, during, or after your surgery? Yes   Have you or any of your relatives ever had problems with anesthesia? YES -  Mother and Son with Hypotension after   Do you have sleep apnea, excessive snoring or daytime drowsiness? No   Do you have any artifical heart valves or other implanted medical devices like a pacemaker, defibrillator, or continuous glucose monitor? No   Do you have artificial joints? No   Are you allergic to latex? No             ROS:  Denies chest pain, shortness of breath, new bowel or bladder symptoms.      Current Outpatient Medications   Medication Sig Dispense Refill     aspirin 81 MG EC tablet Take 81 mg by mouth daily.       calcium citrate/vitamin D3 (CALCIUM CITRATE + D ORAL) Take by mouth.       celecoxib (CELEBREX) 100 MG capsule Take 100 mg by mouth 2 (two) times a day.       cholecalciferol, vitamin D3, (VITAMIN D3) 50 mcg (2,000 unit) capsule Take 1,000 Units by mouth daily.       denosumab 60 mg/mL Syrg Inject 60 mg under the skin once. December 30th       famotidine (PEPCID) 20 MG tablet Take 20 mg by mouth 2 (two) times a day.       omega-3/dha/epa/dpa/fish oil (OMEGA-3 2100 ORAL) Take by mouth.       polyethylene glycol (MIRALAX) 17 gram packet Take 17 g by mouth daily.       potassium citrate (UROCIT-K 15) 15 mEq TbER Take 1 tablet  by mouth 3 (three) times a day. 180 tablet 4     pseudoephedrine-guaifenesin (MUCINEX D)  mg per tablet Take 1 tablet by mouth every 12 (twelve) hours. 90 tablet 0     rosuvastatin (CRESTOR) 5 MG tablet Take 1 tablet (5 mg total) by mouth daily. 90 tablet 0     timoloL maleate (TIMOPTIC) 0.5 % ophthalmic solution 1 drop 2 (two) times a day.       UNABLE TO FIND Med Name: multi collagen joint mobility       No current facility-administered medications for this visit.         Allergies   Allergen Reactions     Atorvastatin Calcium Unknown     Cerivastatin Sodium Unknown     Doxycycline Unknown     Fosamax [Alendronate] Unknown     Esophagea stricture     Pravastatin Unknown       Patient Active Problem List   Diagnosis     Disorder of lipoid metabolism     Inflammatory osteoarthritis     Osteopenia     Postherpetic neuralgia     Vasomotor rhinitis     Hiatal hernia     Renal lithiasis     Calculus of kidney     Calculus of ureter       No past medical history on file.    Past Surgical History:   Procedure Laterality Date     CARPAL TUNNEL RELEASE       FACIAL COSMETIC SURGERY       REDUCTION MAMMAPLASTY         Social History     Socioeconomic History     Marital status:      Spouse name: Not on file     Number of children: Not on file     Years of education: Not on file     Highest education level: Not on file   Occupational History     Not on file   Social Needs     Financial resource strain: Not on file     Food insecurity     Worry: Not on file     Inability: Not on file     Transportation needs     Medical: Not on file     Non-medical: Not on file   Tobacco Use     Smoking status: Never Smoker     Smokeless tobacco: Never Used   Substance and Sexual Activity     Alcohol use: Never     Frequency: Never     Drug use: Never     Sexual activity: Not on file   Lifestyle     Physical activity     Days per week: Not on file     Minutes per session: Not on file     Stress: Not on file   Relationships      "Social connections     Talks on phone: Not on file     Gets together: Not on file     Attends Zoroastrianism service: Not on file     Active member of club or organization: Not on file     Attends meetings of clubs or organizations: Not on file     Relationship status: Not on file     Intimate partner violence     Fear of current or ex partner: Not on file     Emotionally abused: Not on file     Physically abused: Not on file     Forced sexual activity: Not on file   Other Topics Concern     Not on file   Social History Narrative    She is  (1951) and her  passed away from delusional dementia and parkinson's disease and started the [facility] at .  They have 3 sons, 6 grandchildren.  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       Patient Care Team:  Wendy Garcia MD as PCP - General (Internal Medicine)  Wendy Garcia MD as Assigned PCP  Shaan Hinkle MD as Assigned Surgical Provider        Objective:     Vitals:    05/25/21 1414   BP: 132/80   Pulse: 78   Resp: 18   SpO2: 98%   Weight: 121 lb 4 oz (55 kg)   Height: 5' 2\" (1.575 m)         Physical Exam:  EYES: Eyelids, conjunctiva, and sclera were normal. Pupils were normal. Cornea, iris, and lens were normal bilaterally.  HEAD, EARS, NOSE, MOUTH, AND THROAT: Head and face were normal. Hearing was normal to voice and the ears were normal to external exam. Nose appearance was normal and there was no discharge. Oropharynx was normal.  TMs were normal.  NECK: Neck appearance was normal. There were no neck masses and the thyroid was not enlarged.  RESPIRATORY: Breathing pattern was normal and the chest moved symmetrically.   Lung sounds were equal bilaterally.  CARDIOVASCULAR: Heart rate and rhythm were normal.  S1 and S2 were normal and there were no extra sounds or murmurs. Peripheral pulses in arms and legs were normal.  Jugular venous pressure was normal.  There was no peripheral edema.  GASTROINTESTINAL: The " abdomen was normal in contour.  Bowel sounds were present.   Palpation detected no tenderness, mass, or enlarged organs.   MUSCULOSKELETAL: Skeletal configuration was normal and muscle mass was normal for age. Joint appearance was overall normal.  LYMPHATIC: There were no enlarged nodes.  SKIN/HAIR/NAILS: Skin color was normal.  There were no abnormal skin lesions.  Hair and nails were normal.  NEUROLOGIC: The patient was alert and oriented to person, place, time, and circumstance. Speech was normal. Cranial nerves were normal. Motor strength was normal for age. The patient was normally coordinated.  PSYCHIATRIC:  Mood and affect were normal and the patient had normal recent and remote memory. The patient's judgment and insight were normal.          EKG: Personally reviewed-normal sinus rhythm with no acute abnormalities    Labs: A CBC and BMP are pending  Recent Results (from the past 24 hour(s))   Electrocardiogram Perform and Read    Collection Time: 05/25/21  2:25 PM   Result Value Ref Range    SYSTOLIC BLOOD PRESSURE      DIASTOLIC BLOOD PRESSURE      VENTRICULAR RATE 74 BPM    ATRIAL RATE 74 BPM    P-R INTERVAL 168 ms    QRS DURATION 84 ms    Q-T INTERVAL 390 ms    QTC CALCULATION (BEZET) 432 ms    P Axis 43 degrees    R AXIS 12 degrees    T AXIS 43 degrees    MUSE DIAGNOSIS       Normal sinus rhythm  Normal ECG  When compared with ECG of 30-OCT-2020 15:01,  No significant change was found         Start Time: 220 PM  End time:  3:02 PM  Conversation plus orders: 40 minutes  Dictation time: 3 minutes    The visit lasted a total of 43 minutes     Immunization History   Administered Date(s) Administered     COVID-19,PF,Pfizer 02/03/2021, 02/24/2021     Hep A, Adult IM (19yr & older) 10/16/2009     Hep A, historic 10/16/2009     Influenza,quad,high Dose,PF, 65yr + 10/13/2020     Pneumo Conj 13-V (2010&after) 04/16/2015, 11/20/2018     Pneumo Polysac 23-V 06/06/2006, 09/02/2020     Tdap 08/02/2011     Typhoid Live,  Oral 10/16/2009     Typhoid, Inj, Inactive 10/16/2009     ZOSTER, LIVE 06/07/2007     ZOSTER, RECOMBINANT, IM 09/09/2020, 11/21/2020         Electronically signed by Wendy Garcia MD 05/25/21 2:16 PM

## 2021-06-25 NOTE — PROGRESS NOTES
Speech Language/Pathology  Videofluoroscopic Swallow Study     Problem:  Patient Active Problem List   Diagnosis     Disorder of lipoid metabolism     Inflammatory osteoarthritis     Osteopenia     Postherpetic neuralgia     Vasomotor rhinitis     Hiatal hernia     Renal lithiasis     Calculus of kidney     Calculus of ureter     Partial duplication of ureter     Onset date/date of surgery: 6/2/2021   Pertinent history includes: Pt is a 80 year old female with above medical history.  Pt was referred for this evaluation to rule out silent aspiration given ground glass nodules and small hiatal hernia.   Current Diet: Reg/Thin  Baseline Diet: Reg/Thin    Patient presents as alert and cooperative during this evaluation.   An  was not applicable.    Patient was given puree, nectar, thin and regular solid.    Oral Phase:    Dentition/Oral hygiene: Intact/WNL    Bolus prep and oral control was not impaired. Mastication was safe and effective and the patient used rotary chewing.    Anterior-Posterior transit was not impaired.    Premature spillage did not occur with any texture.    Tongue base retraction was not impaired.    Oral stasis did not occur with any texture.    Pharyngeal Phase:    Aspiration did not occur with any texture.     Laryngeal penetration did not occur with any texture.     Swallow response was timely with all textures trialed. Swallow response occurred at the level of the valleculae or sooner with all trials.    Epiglottic movement was complete consistently across texture trials.    Pharyngeal stasis did not occur with any texture.     Pharyngeal constriction was not impaired.    Hyolaryngeal elevation was not impaired. Hyolaryngeal excursion was not impaired.    Cricopharyngeal function was not impaired. Cervical esophageal function was not impaired.    Assessment:    Patient demonstrated no oral and no pharyngeal dysphagia.    Patient is at minimal aspiration risk with all intake.    Rehab  potential is good based on evaluation results.    Recommendations:    Plan: Regular and thin liquids    Strategies: Standard safe swallow strategies of upright for intake, small bites and sips and eat slowly.    Speech therapy is not recommended at this time.    Referrals: N/A    Time: 20 minutes dysphagia evaluation    Debbie Santos MA, CCC-SLP

## 2021-06-26 NOTE — PATIENT INSTRUCTIONS - HE
It was a pleasure to see you today.     There are subtle abnormalities, very small nodules at the bases of your lungs. The list of possibilities as to what this could be (low grade infection, inflammation) can be quite long. I recommend:    1. Swallowing evaluation to rule out silent aspiration in the lungs  2. Induced sputum to culture for atypical infections  3. Lab testing    This does not appear, and is not behaving like a run of the mill bacterial pneumonia, I do not recommend antibiotics at this time, as this will also effect our testing.     Your oxygen levels are normal. This was an incidental finding, and there is no reason you cannot proceed with your kidney stone removal. I hope to have your testing back so we can then decide about going ahead with the knee surgery, or delaying.    I will call you with testing results and we will determine next steps.      Dee Dee Short MD  Pulmonary and Critical Care Medicine  [facility]  Office: [phone]

## 2021-06-26 NOTE — ANESTHESIA POSTPROCEDURE EVALUATION
Patient: Shelbie Rosenant  Procedure(s):  CYSTOURETEROSCOPY, WITH RETROGRADE PYELOGRAM,URETEROSCOPIC STONE EXTRACTION, AND STENT INSERTION (Right)  Anesthesia type: general    Patient location: Phase II Recovery  Last vitals:   Vitals Value Taken Time   /88 05/28/21 1115   Temp 36.1  C (97  F) 05/28/21 1115   Pulse 73 05/28/21 1123   Resp 14 05/28/21 1115   SpO2 97 % 05/28/21 1123   Vitals shown include unvalidated device data.  Post vital signs: stable  Level of consciousness: awake and responds to simple questions  Post-anesthesia pain: pain controlled  Post-anesthesia nausea and vomiting: no  Pulmonary: unassisted, return to baseline  Cardiovascular: stable and blood pressure at baseline  Hydration: adequate  Anesthetic events: no    QCDR Measures:  ASA# 11 - Britni-op Cardiac Arrest: ASA11B - Patient did NOT experience unanticipated cardiac arrest  ASA# 12 - Britni-op Mortality Rate: ASA12B - Patient did NOT die  ASA# 13 - PACU Re-Intubation Rate: ASA13B - Patient did NOT require a new airway mgmt  ASA# 10 - Composite Anes Safety: ASA10A - No serious adverse event    Additional Notes:

## 2021-06-26 NOTE — ANESTHESIA CARE TRANSFER NOTE
Last vitals:   Vitals:    05/28/21 1048   BP: 154/78   Pulse: 72   Resp: 14   Temp: (!) 35.6  C (96  F)   SpO2: 100%     Patient's level of consciousness is drowsy  Spontaneous respirations: yes  Maintains airway independently: yes  Dentition unchanged: yes  Oropharynx: oropharynx clear of all foreign objects    QCDR Measures:  ASA# 20 - Surgical Safety Checklist: WHO surgical safety checklist completed prior to induction    PQRS# 430 - Adult PONV Prevention: 4558F - Pt received => 2 anti-emetic agents (different classes) preop & intraop  ASA# 8 - Peds PONV Prevention: NA - Not pediatric patient, not GA or 2 or more risk factors NOT present  PQRS# 424 - Britni-op Temp Management: 4559F - At least one body temp DOCUMENTED => 35.5C or 95.9F within required timeframe  PQRS# 426 - PACU Transfer Protocol: - Transfer of care checklist used  ASA# 14 - Acute Post-op Pain: ASA14B - Patient did NOT experience pain >= 7 out of 10

## 2021-06-26 NOTE — ANESTHESIA PREPROCEDURE EVALUATION
Anesthesia Evaluation      Patient summary reviewed   No history of anesthetic complications     Airway   Mallampati: II  Neck ROM: full   Pulmonary - negative ROS and normal exam    breath sounds clear to auscultation                         Cardiovascular - negative ROS and normal exam  Rhythm: regular  Rate: normal,         Neuro/Psych - negative ROS     Endo/Other    (+) arthritis,      Comments: Glaucoma    GI/Hepatic/Renal    (+) hiatal hernia, GERD well controlled,   chronic renal disease (Nephrolithiasis ),           Dental - normal exam                        Anesthesia Plan  Planned anesthetic: general endotracheal and total IV anesthesia    ASA 2   Induction: intravenous   Anesthetic plan and risks discussed with: patient  Anesthesia plan special considerations: antiemetics,   Post-op plan: routine recovery

## 2021-06-29 ENCOUNTER — COMMUNICATION - HEALTHEAST (OUTPATIENT)
Dept: INTERNAL MEDICINE | Facility: CLINIC | Age: 81
End: 2021-06-29

## 2021-06-29 ENCOUNTER — DOCUMENTATION ONLY (OUTPATIENT)
Dept: OTHER | Facility: CLINIC | Age: 81
End: 2021-06-29

## 2021-06-29 ENCOUNTER — COMMUNICATION - HEALTHEAST (OUTPATIENT)
Dept: FAMILY MEDICINE | Facility: CLINIC | Age: 81
End: 2021-06-29

## 2021-06-29 DIAGNOSIS — M81.0 OSTEOPOROSIS: ICD-10-CM

## 2021-06-29 DIAGNOSIS — Z92.29 PERSONAL HISTORY OF OTHER DRUG THERAPY: ICD-10-CM

## 2021-06-30 ENCOUNTER — RECORDS - HEALTHEAST (OUTPATIENT)
Dept: ADMINISTRATIVE | Facility: OTHER | Age: 81
End: 2021-06-30

## 2021-07-03 NOTE — ADDENDUM NOTE
Addendum Note by Chi Garcia MD at 11/24/2020  8:40 AM     Author: Chi Garcia MD Service: -- Author Type: Physician    Filed: 11/24/2020  3:59 PM Encounter Date: 11/24/2020 Status: Signed    : Chi Garcia MD (Physician)    Addended by: CHI GARCIA on: 11/24/2020 03:59 PM        Modules accepted: Orders

## 2021-07-06 VITALS — BODY MASS INDEX: 22.13 KG/M2 | BODY MASS INDEX: 22.13 KG/M2 | WEIGHT: 121 LBS | HEIGHT: 62 IN

## 2021-07-07 NOTE — TELEPHONE ENCOUNTER
"Prolia Injection Phone Screen      Screening questions have been asked 2-3 days prior to administration visit for Prolia. If any questions are answered with \"Yes,\" this phone encounter were will routed to ordering provider for further evaluation.     1.  When was the last injection?  12/30/2020     2.  Has insurance for this injection been verified?  Yes    3.  Did you experience any new onset achiness or rashes that lasted for over a month with your previous Prolia injection?   No    4.  Do you have a fever over 101?F or a new deep cough that is unusual for you today? No    5.  Have you started any new medications in the last 6 months that you were told could affect your immune system? These may have been prescribed by oncologist, transplant, rheumatology, or dermatology.   No    6.  In the last 6 months have you have gastric bypass or parathyroid surgery?   No    7.  Do you plan dental work requiring drilling into the bone such as implants/extractions or oral surgery in the next 2-3 months?   No    8. Do you have new insurance since the last injection?  No  9. Have you received the Covid-19 vaccine? Yes  If No - Proceed with Prolia injection  If Yes - Date of vaccination 2/24/21. Will need to wait until 2 weeks after 2nd dose of Covid-19 vaccine before administering Prolia       Patient informed if symptoms discussed above present prior to their administration appointment, they are to notify clinic immediately.     Laura Sarabia                                    "

## 2021-07-08 LAB
ACID FAST STN SPEC QL: NORMAL
BACTERIA SPEC CULT: NORMAL

## 2021-07-13 ENCOUNTER — DOCUMENTATION ONLY (OUTPATIENT)
Dept: OTHER | Facility: CLINIC | Age: 81
End: 2021-07-13

## 2021-07-28 ENCOUNTER — TRANSFERRED RECORDS (OUTPATIENT)
Dept: HEALTH INFORMATION MANAGEMENT | Facility: CLINIC | Age: 81
End: 2021-07-28

## 2021-08-11 ENCOUNTER — OFFICE VISIT (OUTPATIENT)
Dept: INTERNAL MEDICINE | Facility: CLINIC | Age: 81
End: 2021-08-11
Payer: MEDICARE

## 2021-08-11 VITALS
SYSTOLIC BLOOD PRESSURE: 136 MMHG | DIASTOLIC BLOOD PRESSURE: 74 MMHG | TEMPERATURE: 97.8 F | OXYGEN SATURATION: 98 % | HEART RATE: 64 BPM | WEIGHT: 122.5 LBS | BODY MASS INDEX: 22.41 KG/M2

## 2021-08-11 DIAGNOSIS — R68.83 CHILLS: Primary | ICD-10-CM

## 2021-08-11 DIAGNOSIS — Z96.652 STATUS POST LEFT KNEE REPLACEMENT: ICD-10-CM

## 2021-08-11 DIAGNOSIS — J84.9 ILD (INTERSTITIAL LUNG DISEASE) (H): ICD-10-CM

## 2021-08-11 LAB
ALBUMIN SERPL-MCNC: 4.1 G/DL (ref 3.5–5)
ALBUMIN UR-MCNC: NEGATIVE MG/DL
ALP SERPL-CCNC: 59 U/L (ref 45–120)
ALT SERPL W P-5'-P-CCNC: 15 U/L (ref 0–45)
ANION GAP SERPL CALCULATED.3IONS-SCNC: 11 MMOL/L (ref 5–18)
APPEARANCE UR: CLEAR
AST SERPL W P-5'-P-CCNC: 21 U/L (ref 0–40)
BILIRUB SERPL-MCNC: 0.3 MG/DL (ref 0–1)
BILIRUB UR QL STRIP: NEGATIVE
BUN SERPL-MCNC: 11 MG/DL (ref 8–28)
CALCIUM SERPL-MCNC: 9.8 MG/DL (ref 8.5–10.5)
CHLORIDE BLD-SCNC: 104 MMOL/L (ref 98–107)
CO2 SERPL-SCNC: 25 MMOL/L (ref 22–31)
COLOR UR AUTO: YELLOW
CREAT SERPL-MCNC: 0.59 MG/DL (ref 0.6–1.1)
ERYTHROCYTE [DISTWIDTH] IN BLOOD BY AUTOMATED COUNT: 13.6 % (ref 10–15)
ERYTHROCYTE [SEDIMENTATION RATE] IN BLOOD BY WESTERGREN METHOD: 25 MM/HR (ref 0–20)
GFR SERPL CREATININE-BSD FRML MDRD: 86 ML/MIN/1.73M2
GLUCOSE BLD-MCNC: 85 MG/DL (ref 70–125)
GLUCOSE UR STRIP-MCNC: NEGATIVE MG/DL
HCT VFR BLD AUTO: 35.8 % (ref 35–47)
HGB BLD-MCNC: 11.1 G/DL (ref 11.7–15.7)
HGB UR QL STRIP: NEGATIVE
KETONES UR STRIP-MCNC: NEGATIVE MG/DL
LEUKOCYTE ESTERASE UR QL STRIP: NEGATIVE
MCH RBC QN AUTO: 28.3 PG (ref 26.5–33)
MCHC RBC AUTO-ENTMCNC: 31 G/DL (ref 31.5–36.5)
MCV RBC AUTO: 91 FL (ref 78–100)
NITRATE UR QL: NEGATIVE
PH UR STRIP: 6 [PH] (ref 5–8)
PLATELET # BLD AUTO: 336 10E3/UL (ref 150–450)
POTASSIUM BLD-SCNC: 4.3 MMOL/L (ref 3.5–5)
PROT SERPL-MCNC: 7.4 G/DL (ref 6–8)
RBC # BLD AUTO: 3.92 10E6/UL (ref 3.8–5.2)
SODIUM SERPL-SCNC: 140 MMOL/L (ref 136–145)
SP GR UR STRIP: 1.02 (ref 1–1.03)
TSH SERPL DL<=0.005 MIU/L-ACNC: 1.24 UIU/ML (ref 0.3–5)
UROBILINOGEN UR STRIP-ACNC: 0.2 E.U./DL
WBC # BLD AUTO: 7.7 10E3/UL (ref 4–11)

## 2021-08-11 PROCEDURE — 81003 URINALYSIS AUTO W/O SCOPE: CPT | Performed by: INTERNAL MEDICINE

## 2021-08-11 PROCEDURE — 36415 COLL VENOUS BLD VENIPUNCTURE: CPT | Performed by: INTERNAL MEDICINE

## 2021-08-11 PROCEDURE — 85652 RBC SED RATE AUTOMATED: CPT | Performed by: INTERNAL MEDICINE

## 2021-08-11 PROCEDURE — 80053 COMPREHEN METABOLIC PANEL: CPT | Performed by: INTERNAL MEDICINE

## 2021-08-11 PROCEDURE — 84443 ASSAY THYROID STIM HORMONE: CPT | Performed by: INTERNAL MEDICINE

## 2021-08-11 PROCEDURE — 99214 OFFICE O/P EST MOD 30 MIN: CPT | Performed by: INTERNAL MEDICINE

## 2021-08-11 PROCEDURE — 85027 COMPLETE CBC AUTOMATED: CPT | Performed by: INTERNAL MEDICINE

## 2021-08-11 RX ORDER — BISACODYL 5 MG/1
5 TABLET, DELAYED RELEASE ORAL DAILY PRN
COMMUNITY

## 2021-08-11 RX ORDER — IBUPROFEN 200 MG
400 TABLET ORAL EVERY 6 HOURS PRN
COMMUNITY
End: 2021-12-16

## 2021-08-11 NOTE — PROGRESS NOTES
[facility] Clinic Follow Up Note    Assessment/Plan:  1. Chills  Nonspecific symptoms of chills in the setting of recent surgery for both knee replacement and complex kidney stones.  Denies fever or sweats.  Occurs 4-5 times per day.  Some exacerbation of Raynaud's.  Nocturia phenomenon.  Generally healthy but nondiagnosed groundglass/interstitial lung disease.  Recommendation: We will do lab work-up as below.  We will ask her to keep an eye for fever.  She is otherwise functionally quite well    - CBC with platelets; Future  - Comprehensive metabolic panel; Future  - ESR: Erythrocyte sedimentation rate; Future  - UA Macro with Reflex to Micro and Culture - lab collect; Future  - CT Chest w/o Contrast; Future  - TSH with free T4 reflex; Future  - CBC with platelets  - Comprehensive metabolic panel  - ESR: Erythrocyte sedimentation rate  - UA Macro with Reflex to Micro and Culture - lab collect  - TSH with free T4 reflex    2. Status post left knee replacement  Incision looks clean and dry.  No surrounding edema.  Only mildly warm to palpation.  Following with [place] [facility]    - CBC with platelets; Future  - Comprehensive metabolic panel; Future  - ESR: Erythrocyte sedimentation rate; Future  - UA Macro with Reflex to Micro and Culture - lab collect; Future  - CBC with platelets  - Comprehensive metabolic panel  - ESR: Erythrocyte sedimentation rate  - UA Macro with Reflex to Micro and Culture - lab collect    3. ILD (interstitial lung disease) (H)  As above-history of incidental finding of groundglass infiltrates on chest CT.  She has been seen by pulmonary.  She had decided to hold off on bronchoscopy.  Recommendation Will update CT  - CT Chest w/o Contrast; Future      Follow-up after tests are complete    Wendy Garcia MD, MD    Chief Complaint:  Chief Complaint   Patient presents with     Follow Up     Nocturia       History of Present Illness:  Shelbie is a 81 year old female who is  here today for follow-up.  Since last visit, she has not only had a rePete surgical procedure for retained kidney stones, she has also had a left knee replacement with [facility].  She was able to have her surgery in the surgical center in [place].  She spent 1 night at the [facility]-supervised by [facility] nursing staff.  She was discharged home with 10 days of hired home care.  All went well.  She has normal range of motion.  Swelling is at the level as expected.  Some mild warmth noted over the site.    Otherwise, she states she is wondering if there is any sort of staph infection.  She states that since she had her last kidney stone surgery, she has had intermittent chills.  She does not have sweats per se.  She notes no fever.  She does not have any other constitutional symptoms.  She states she is fatigued but does understand that she is recovering from 3 surgeries in 1 year.    She is ambulating well.  She denies any chest pain or shortness of breath.    Of note, she would be scheduled to have her follow-up CT scan for the nonspecific interstitial lung process in November.    Review of Systems:  A comprehensive review of systems was performed and was otherwise negative    PFSH:  Social History: She is .  [place] [facility] [place] [facility]  History   Smoking Status     Never Smoker   Smokeless Tobacco     Never Used       Past History:   Current Outpatient Medications   Medication Sig Dispense Refill     amoxicillin (AMOXIL) 500 MG tablet [AMOXICILLIN (AMOXIL) 500 MG TABLET] Take 4 tabs about 30 minute before dental procedure 20 tablet 0     aspirin 81 MG EC tablet [ASPIRIN 81 MG EC TABLET] Take 81 mg by mouth daily.       bisacodyl (DULCOLAX) 5 MG EC tablet Take 5 mg by mouth daily as needed for constipation       calcium citrate/vitamin D3 (CALCIUM CITRATE + D ORAL) [CALCIUM CITRATE/VITAMIN D3 (CALCIUM CITRATE + D ORAL)] Take by mouth.        cholecalciferol, vitamin D3, (VITAMIN D3) 50 mcg (2,000 unit) capsule [CHOLECALCIFEROL, VITAMIN D3, (VITAMIN D3) 50 MCG (2,000 UNIT) CAPSULE] Take 1,000 Units by mouth daily.       ibuprofen (ADVIL/MOTRIN) 200 MG tablet Take 400 mg by mouth every 6 hours as needed for mild pain       omega-3/dha/epa/dpa/fish oil (OMEGA-3 2100 ORAL) [OMEGA-3/DHA/EPA/DPA/FISH OIL (OMEGA-3 2100 ORAL)] Take by mouth.       omeprazole (PRILOSEC) 20 MG capsule [OMEPRAZOLE (PRILOSEC) 20 MG CAPSULE] Take 1 capsule (20 mg total) by mouth daily before breakfast. 90 capsule 3     polyethylene glycol (MIRALAX) 17 gram packet [POLYETHYLENE GLYCOL (MIRALAX) 17 GRAM PACKET] Take 17 g by mouth daily.       rosuvastatin (CRESTOR) 5 MG tablet [ROSUVASTATIN (CRESTOR) 5 MG TABLET] Take 1 tablet (5 mg total) by mouth daily. 90 tablet 0     timoloL maleate (TIMOPTIC) 0.5 % ophthalmic solution [TIMOLOL MALEATE (TIMOPTIC) 0.5 % OPHTHALMIC SOLUTION] 1 drop 2 (two) times a day.       UNABLE TO FIND [UNABLE TO FIND] Med Name: multi collagen joint mobility         Family History:     Physical Exam:  General Appearance:   She appears quite well and in no acute distress.  Her blood pressure and vitals are stable.  She is afebrile.  Neck is supple with no lymphadenopathy  Lungs are clear to auscultation and percussion  Cardiac exam reveals regular rate and rhythm.  No tachycardia is noted  Abdomen soft and nontender  Extremities are examined and there is no abnormal calf swelling/etc.      Vitals:    08/11/21 1152   BP: 136/74   BP Location: Left arm   Patient Position: Sitting   Cuff Size: Adult Small   Pulse: 64   Temp: 97.8  F (36.6  C)   TempSrc: Tympanic   SpO2: 98%   Weight: 55.6 kg (122 lb 8 oz)     Wt Readings from Last 3 Encounters:   08/11/21 55.6 kg (122 lb 8 oz)   05/26/21 54.9 kg (121 lb)   05/26/21 55.5 kg (122 lb 4.8 oz)     Body mass index is 22.41 kg/m .

## 2021-08-14 ENCOUNTER — TRANSFERRED RECORDS (OUTPATIENT)
Dept: HEALTH INFORMATION MANAGEMENT | Facility: CLINIC | Age: 81
End: 2021-08-14
Payer: MEDICARE

## 2021-08-25 ENCOUNTER — TRANSFERRED RECORDS (OUTPATIENT)
Dept: HEALTH INFORMATION MANAGEMENT | Facility: CLINIC | Age: 81
End: 2021-08-25

## 2021-08-25 ENCOUNTER — HOSPITAL ENCOUNTER (OUTPATIENT)
Dept: CT IMAGING | Facility: CLINIC | Age: 81
Discharge: HOME | End: 2021-08-25
Attending: INTERNAL MEDICINE | Admitting: INTERNAL MEDICINE
Payer: MEDICARE

## 2021-08-25 DIAGNOSIS — J84.9 ILD (INTERSTITIAL LUNG DISEASE) (H): ICD-10-CM

## 2021-08-25 DIAGNOSIS — R68.83 CHILLS: ICD-10-CM

## 2021-08-25 PROCEDURE — 71250 CT THORAX DX C-: CPT | Mod: MG

## 2021-08-26 ENCOUNTER — TELEPHONE (OUTPATIENT)
Dept: INTERNAL MEDICINE | Facility: CLINIC | Age: 81
End: 2021-08-26

## 2021-08-26 NOTE — TELEPHONE ENCOUNTER
Lisa, the CT of the chest is similar to in May.  There are no new lesions, but some have changed mildly in appearance.  I would like you to check back in with the lung specialist.

## 2021-09-05 ENCOUNTER — HEALTH MAINTENANCE LETTER (OUTPATIENT)
Age: 81
End: 2021-09-05

## 2021-09-14 ENCOUNTER — OFFICE VISIT (OUTPATIENT)
Dept: INTERNAL MEDICINE | Facility: CLINIC | Age: 81
End: 2021-09-14
Payer: MEDICARE

## 2021-09-14 VITALS
HEART RATE: 70 BPM | DIASTOLIC BLOOD PRESSURE: 70 MMHG | OXYGEN SATURATION: 96 % | SYSTOLIC BLOOD PRESSURE: 128 MMHG | BODY MASS INDEX: 22.46 KG/M2 | WEIGHT: 122.8 LBS

## 2021-09-14 DIAGNOSIS — L98.9 SKIN LESION: ICD-10-CM

## 2021-09-14 DIAGNOSIS — E78.5 HYPERLIPIDEMIA LDL GOAL <100: ICD-10-CM

## 2021-09-14 DIAGNOSIS — K21.00 GASTROESOPHAGEAL REFLUX DISEASE WITH ESOPHAGITIS WITHOUT HEMORRHAGE: ICD-10-CM

## 2021-09-14 DIAGNOSIS — N20.0 RENAL LITHIASIS: ICD-10-CM

## 2021-09-14 DIAGNOSIS — J30.89 SEASONAL ALLERGIC RHINITIS DUE TO OTHER ALLERGIC TRIGGER: ICD-10-CM

## 2021-09-14 DIAGNOSIS — J84.9 ILD (INTERSTITIAL LUNG DISEASE) (H): Primary | ICD-10-CM

## 2021-09-14 DIAGNOSIS — J30.0 VASOMOTOR RHINITIS: ICD-10-CM

## 2021-09-14 DIAGNOSIS — R11.0 NAUSEA: ICD-10-CM

## 2021-09-14 PROCEDURE — 99214 OFFICE O/P EST MOD 30 MIN: CPT | Performed by: INTERNAL MEDICINE

## 2021-09-14 PROCEDURE — 91300 PR COVID VAC PFIZER DIL RECON 30 MCG/0.3 ML IM: CPT | Performed by: INTERNAL MEDICINE

## 2021-09-14 PROCEDURE — 0003A PR COVID VAC PFIZER DIL RECON 30 MCG/0.3 ML IM: CPT | Performed by: INTERNAL MEDICINE

## 2021-09-14 RX ORDER — GUAIFENESIN AND DEXTROMETHORPHAN HYDROBROMIDE 600; 30 MG/1; MG/1
1 TABLET, EXTENDED RELEASE ORAL EVERY 12 HOURS
Qty: 30 TABLET | Refills: 3 | Status: SHIPPED | OUTPATIENT
Start: 2021-09-14 | End: 2021-12-16

## 2021-09-14 RX ORDER — ROSUVASTATIN CALCIUM 5 MG/1
5 TABLET, COATED ORAL DAILY
Qty: 90 TABLET | Refills: 0 | Status: SHIPPED | OUTPATIENT
Start: 2021-09-14 | End: 2022-06-09

## 2021-09-14 RX ORDER — DESLORATADINE 5 MG/1
5 TABLET ORAL DAILY
Qty: 30 TABLET | Refills: 1 | Status: SHIPPED | OUTPATIENT
Start: 2021-09-14 | End: 2021-12-06

## 2021-09-14 NOTE — PROGRESS NOTES
[facility] Follow Up Note    Assessment/Plan:  1. ILD (interstitial lung disease) (H)  CT scan continues to be abnormal.  Follow-up with pulmonary specialist in 3 months.  Of note, the night sweats which prompted an early evaluation have dissipated.  - HC ADMIN COVID VAC PFIZER DIL RECON IM, 3RD DOSE    2. Hyperlipidemia LDL goal <100  Medications renewed  - rosuvastatin (CRESTOR) 5 MG tablet; Take 1 tablet (5 mg) by mouth daily  Dispense: 90 tablet; Refill: 0    3. Gastroesophageal reflux disease with esophagitis without hemorrhage  Medications renewed  - omeprazole (PRILOSEC) 20 MG DR capsule; Take 1 capsule (20 mg) by mouth daily before breakfast  Dispense: 90 capsule; Refill: 3    4. Seasonal allergic rhinitis due to other allergic trigger  Mucinex DM and will try Clarinex to see if upper airway congestion can be improved  - desloratadine (CLARINEX) 5 MG tablet; Take 1 tablet (5 mg) by mouth daily  Dispense: 30 tablet; Refill: 1    5. Skin lesion  Small erythematous skin lesion right anterior chest slightly irregular border but no deep pigmentation to  Recommendation: Dermatology referral-Dr. Pearl  - Adult Dermatology Referral; Future        Follow-up in 3 months  Wendy Garcia MD, MD    Chief Complaint:  Chief Complaint   Patient presents with     Follow Up     Medication Therapy Management       History of Present Illness:  Shelbie is a 81 year old female who is here today for follow-up of her usual medical problems.  Is been a very stressful year for her.  She is the  of Dr. Hang Acosta.  There have been some disappointments with the closure of the [facility]/etc.  Additionally, she has had some medically difficult issues with a large burden of kidney stones.  She also had a knee replaced.  She was seen here recently and had issues with chills and night sweats following her knee replacement.  She has unspecified abnormal lung lesions on CT scan.  A follow-up was  done.  She will be following up with Dr. Gamboa with regard to the abnormality seen.  Of note, she denies any new chest pain or shortness of breath.  She does complain of persistent allergic rhinitis/upper airway chest congestion.    She states she is doing okay overall.  She wishes that congestion could be improved.  She states she is getting out and seeing friends and this is helping with her spirit.    Review of Systems:  A comprehensive review of systems was performed and was otherwise negative    PFSH:  Social History:   History   Smoking Status     Never Smoker   Smokeless Tobacco     Never Used       Past History:   Past Medical History:   Diagnosis Date     Arthritis      Hiatal hernia        Current Outpatient Medications   Medication Sig Dispense Refill     amoxicillin (AMOXIL) 500 MG tablet [AMOXICILLIN (AMOXIL) 500 MG TABLET] Take 4 tabs about 30 minute before dental procedure 20 tablet 0     aspirin 81 MG EC tablet [ASPIRIN 81 MG EC TABLET] Take 81 mg by mouth daily.       bisacodyl (DULCOLAX) 5 MG EC tablet Take 5 mg by mouth daily as needed for constipation       calcium citrate/vitamin D3 (CALCIUM CITRATE + D ORAL) [CALCIUM CITRATE/VITAMIN D3 (CALCIUM CITRATE + D ORAL)] Take by mouth.       cholecalciferol, vitamin D3, (VITAMIN D3) 50 mcg (2,000 unit) capsule [CHOLECALCIFEROL, VITAMIN D3, (VITAMIN D3) 50 MCG (2,000 UNIT) CAPSULE] Take 1,000 Units by mouth daily.       desloratadine (CLARINEX) 5 MG tablet Take 1 tablet (5 mg) by mouth daily 30 tablet 1     dextromethorphan-guaiFENesin (MUCINEX DM)  MG 12 hr tablet Take 1 tablet by mouth every 12 hours 30 tablet 3     ibuprofen (ADVIL/MOTRIN) 200 MG tablet Take 400 mg by mouth every 6 hours as needed for mild pain       omega-3/dha/epa/dpa/fish oil (OMEGA-3 2100 ORAL) [OMEGA-3/DHA/EPA/DPA/FISH OIL (OMEGA-3 2100 ORAL)] Take by mouth.       omeprazole (PRILOSEC) 20 MG DR capsule Take 1 capsule (20 mg) by mouth daily before breakfast 90 capsule 3      polyethylene glycol (MIRALAX) 17 gram packet [POLYETHYLENE GLYCOL (MIRALAX) 17 GRAM PACKET] Take 17 g by mouth daily.       rosuvastatin (CRESTOR) 5 MG tablet Take 1 tablet (5 mg) by mouth daily 90 tablet 0     timoloL maleate (TIMOPTIC) 0.5 % ophthalmic solution [TIMOLOL MALEATE (TIMOPTIC) 0.5 % OPHTHALMIC SOLUTION] 1 drop 2 (two) times a day.       UNABLE TO FIND [UNABLE TO FIND] Med Name: multi collagen joint mobility         Family History:     Physical Exam:  General Appearance:   She appears at baseline and in no acute distress today  Vitals:    09/14/21 1346   BP: 128/70   BP Location: Left arm   Patient Position: Sitting   Cuff Size: Adult Small   Pulse: 70   SpO2: 96%   Weight: 55.7 kg (122 lb 12.8 oz)     Wt Readings from Last 3 Encounters:   09/14/21 55.7 kg (122 lb 12.8 oz)   08/11/21 55.6 kg (122 lb 8 oz)   05/26/21 54.9 kg (121 lb)     Body mass index is 22.46 kg/m .

## 2021-10-31 ENCOUNTER — HEALTH MAINTENANCE LETTER (OUTPATIENT)
Age: 81
End: 2021-10-31

## 2021-12-03 DIAGNOSIS — J30.89 SEASONAL ALLERGIC RHINITIS DUE TO OTHER ALLERGIC TRIGGER: ICD-10-CM

## 2021-12-05 NOTE — TELEPHONE ENCOUNTER
"Routing refill request to provider for review/approval because:  Age of patient    Last Written Prescription Date:  9/14/21  Last Fill Quantity: 30,  # refills: 1   Last office visit provider: 9/14/21    Requested Prescriptions   Pending Prescriptions Disp Refills     desloratadine (CLARINEX) 5 MG tablet [Pharmacy Med Name: DESLORATADINE 5MG TABS] 30 tablet 1     Sig: TAKE ONE TABLET BY MOUTH EVERY DAY       Antihistamines Protocol Failed - 12/3/2021  9:06 AM        Failed - Patient is 3-64 years of age     Apply weight-based dosing for peds patients age 3 - 12 years of age.    Forward request to provider for patients under the age of 3 or over the age of 64.          Passed - Recent (12 mo) or future (30 days) visit within the authorizing provider's specialty     Patient has had an office visit with the authorizing provider or a provider within the authorizing providers department within the previous 12 mos or has a future within next 30 days. See \"Patient Info\" tab in inbasket, or \"Choose Columns\" in Meds & Orders section of the refill encounter.              Passed - Medication is active on med list             Shaila Ferris RN 12/05/21 3:32 PM  "

## 2021-12-06 RX ORDER — DESLORATADINE 5 MG/1
TABLET ORAL
Qty: 30 TABLET | Refills: 1 | Status: SHIPPED | OUTPATIENT
Start: 2021-12-06 | End: 2022-03-02

## 2021-12-08 ENCOUNTER — HOSPITAL ENCOUNTER (OUTPATIENT)
Dept: CT IMAGING | Facility: CLINIC | Age: 81
Discharge: HOME | End: 2021-12-08
Attending: INTERNAL MEDICINE | Admitting: INTERNAL MEDICINE
Payer: MEDICARE

## 2021-12-08 DIAGNOSIS — R91.8 PULMONARY NODULES: ICD-10-CM

## 2021-12-08 PROCEDURE — 71250 CT THORAX DX C-: CPT | Mod: MG

## 2021-12-16 ENCOUNTER — OFFICE VISIT (OUTPATIENT)
Dept: PULMONOLOGY | Facility: OTHER | Age: 81
End: 2021-12-16
Payer: MEDICARE

## 2021-12-16 VITALS
WEIGHT: 126.6 LBS | DIASTOLIC BLOOD PRESSURE: 72 MMHG | SYSTOLIC BLOOD PRESSURE: 126 MMHG | OXYGEN SATURATION: 96 % | HEART RATE: 76 BPM | BODY MASS INDEX: 23.16 KG/M2

## 2021-12-16 DIAGNOSIS — R91.8 PULMONARY NODULES: Primary | ICD-10-CM

## 2021-12-16 PROCEDURE — 99213 OFFICE O/P EST LOW 20 MIN: CPT | Performed by: INTERNAL MEDICINE

## 2021-12-16 RX ORDER — UBIDECARENONE 100 MG
100 CAPSULE ORAL DAILY
COMMUNITY

## 2021-12-16 NOTE — PATIENT INSTRUCTIONS
It was a pleasure to see you today.    Everything on your imaging is stable/unchanged. We discussed options for diagnostic procedures, and will proceed with follow up imaging.     We will follow up in 1 year with repeat imaging. Let me know if anyone orders CT scans between now and then so we do not repeat this unnecessarily.      Dee Dee Lo MD  Pulmonary and Critical Care Medicine  [facility]  Office: [phone]

## 2021-12-16 NOTE — PROGRESS NOTES
Pulmonary Clinic Outpatient Progress Note:    Assessment and Plan:   81 y F with a history of HLD, GERD, who presents for an evaluation of incidental findings on CT scan of the chest, with bilateral, peripheral LL centrilobular ground glass nodules. She is asymptomatic. These have been present since at least 9/2020, the last chest image I have access to.     Differential diagnosis for finding is broad and includes atypical infectious, inflammatory (HP, chronic aspiration pneumonitis), hematogenous spread of malignancy. Basilar predominance makes some of these less likely. She has no symptoms to suggest an advanced malignancy and is up to date on cancer screenings for age. Imaging findings and clinical presentation is not consistent with acute bacterial process. A non invasive evaluation (sputum, serologies outlined below) has been negative. She remains asymptomatic, with stable imaging reviewed today.    Recommendations:  We reviewed all imaging and testing again in detail. Findings have been stable ~15 months. She remains asymptomatic. Discussed that I cannot provide her a definitive diagnosis with the current information, but we can discuss what things are LESS likely given stability and her lack of symptoms (malignancy, acute infection). We dicussed bronchoscopy with BAL which may be non diagnostic, but I am happy to offer it - she does not want to pursue any procedures at this time. There is nothing amenable to biopsy other than a larger/open lung biopsy.     Discussed follow up CT in 1 year to establish longer duration of stability, and we will plan on this. She will contact me in the interim if any new symptoms arise.      Follow up 1 year with CT scan.     Dee Dee Lo MD  Pulmonary and Critical Care Medicine  [facility]  Office: [phone]    ----------------------    CC: Pulmonary nodules    Interval HPI:   - GERD is better on PPI  - No new symptoms (cough, fevers, SOB, decreased exercise  tolerance, fatigue, poor appetite)    ---------------  6/3/21:  Negative endemic mycoses serologies  Negative swallow evaluation  Fungal sputum cx in process, AFB smear negative, bacterial specimen inadequate for testing.  She continues to deny respiratory symptoms. She does endorse worsening GERD sxs despite pepcid with refluxing overnight when supine     Discussed possibilities again - inflammatory process in the setting of microaspiration/GERD, indolent atypical infectious (not bacterial), appearance and risk factors less favoring of malignancy.   Next step would be bronchoscopy with BAL, nodules are too small for biopsy. I acknowledged it is possible for this to be non diagnostic as well. She declines to pursue this at this time.   She asked if the imaging may reflect asymptomatic COVID-19 infection - the imaging is not consistent with what is typically seen with COVID, and these faint GG nodules have also been present since at least 9/2020     Recommended more aggressive GERD tx with PPI daily. She was in agreement with this.  Recommended repeat imaging in 6 months. If the findings are increased/worsened, will again recommend diagnostic procedure. She was in agreement with this plan  No contraindication to planned orthopedic procedure this month.    ------------------  80 y F with a history of HLD, GERD, who presents for an evaluation of incidental findings on CT scan of the chest. She has bilateral, peripheral LL centrilobular ground glass nodules, which are present to some extent on CT scan A/P dating back to Sept 2020, better visualized on the recent high resolution CT chest.    She denies shortness of breath, good exertional abilities. Denies cough. Has long standing nasal congestion wit post nasal drip that was previously evaluated. Minimal tobacco history, with sporadic smoking in college for 4 years, quit in 1960. No known personal, or family history of respiratory disorders.     No reported dysphagia,  coughing with oral intake. Has GERD, and small esophageal hiatal hernia on imaging.     No known exposures throughout her life - mold, birds, farming. No pets. Worked in interior design. No known TB contacts, no identified risk factors. Reports she is up to date on all routine cancer screenings.    No fevers, chills, weight loss, night sweats.    ROS:  A 12-system review was obtained and was negative with the exception of the symptoms endorsed in the history of present illness.    PMH:  GERD  HLD    PSH:  Past Surgical History:   Procedure Laterality Date     CARPAL TUNNEL RELEASE       FACIAL COSMETIC SURGERY       REDUCTION MAMMAPLASTY       Social Hx:  Never smoker      Current Meds:  Reviewed    Physical Exam:  /72   Pulse 76   Wt 57.4 kg (126 lb 9.6 oz)   SpO2 96%   BMI 23.16 kg/m    Gen: Alert, oriented, no distress  HEENT: nasal turbinates are unremarkable, no oropharyngeal lesions, no cervical or supraclavicular lymphadenopathy, no thyromegaly  CV: RRR, no M/G/R  Resp: CTAB, no focal crackles or wheezes  Abd: soft, nontender, no palpable organomegaly  Skin: no apparent rashes  Ext: no cyanosis, clubbing or edema  Neuro: alert, nonfocal    Labs:  Reviewed    Imaging studies:  Personally reviewed and interpreted:  Multiple CT scans reviewed in detail    12/8/21 CT Chest:  LUNGS AND PLEURA: Stable multiple bilateral pulmonary nodules up to roughly 5 mm in size. As before, some of these demonstrate subtle intrinsic low-attenuation or cavitation. Many of these nodules are perilymphatic in distribution along the fissures,   though others are more random. No pleural effusion or pneumothorax.     MEDIASTINUM/AXILLAE: No adenopathy. Small hiatus hernia.     UPPER ABDOMEN: Nothing acute.     MUSCULOSKELETAL: Bony demineralization.                                                       IMPRESSION:   1.  No significant change since prior. Stable pulmonary nodules.    5/23/21 High Res CT Chest:  LUNGS AND  PLEURA: There are innumerable tiny faint centrilobular nodules in the periphery of the lungs and in a lower lung distribution. All of the nodules measure less than 5 mm in diameter and many of the nodules have central low-attenuation. There has   been a slow interval increase in nodules going back to 2/16/2021. No focal area of consolidation or dominant pulmonary nodule. Single tiny benign calcified granuloma left upper lobe. Mild amount of air trapping in the upper lungs. No pleural fluid.   Normal airways.     MEDIASTINUM/AXILLAE: There is a small sliding esophageal hiatal hernia. Atheromatous changes in a normal caliber thoracic aorta. No adenopathy.     CORONARY ARTERY CALCIFICATION: None.     UPPER ABDOMEN: Small low-attenuation lesions in the liver likely represent cysts.     MUSCULOSKELETAL: Degenerative disc disease throughout the spine.     IMPRESSION:   1.  Interval increase in tiny faint centrilobular nodules greater in the lower lungs and a few of which have central low-attenuation. Findings are nonspecific. Differential includes infections such as infectious bronchiolitis and mycobacterial infections including atypical mycobacterial infection. An inflammatory process such as aspiration pneumonitis is a possibility. Less likely possibilities include hypersensitivity pneumonitis and smoking-related processes such as respiratory bronchiolitis or   Langerhans' cell granulomatosis, all of which have an upper lung predisposition.    PFT's  None

## 2021-12-17 ENCOUNTER — OFFICE VISIT (OUTPATIENT)
Dept: INTERNAL MEDICINE | Facility: CLINIC | Age: 81
End: 2021-12-17
Payer: MEDICARE

## 2021-12-17 VITALS
DIASTOLIC BLOOD PRESSURE: 78 MMHG | WEIGHT: 125.5 LBS | SYSTOLIC BLOOD PRESSURE: 158 MMHG | HEART RATE: 66 BPM | OXYGEN SATURATION: 100 % | BODY MASS INDEX: 22.95 KG/M2

## 2021-12-17 DIAGNOSIS — I73.00 RAYNAUD'S DISEASE WITHOUT GANGRENE: ICD-10-CM

## 2021-12-17 DIAGNOSIS — J84.9 ILD (INTERSTITIAL LUNG DISEASE) (H): ICD-10-CM

## 2021-12-17 DIAGNOSIS — J30.0 VASOMOTOR RHINITIS: ICD-10-CM

## 2021-12-17 DIAGNOSIS — K21.00 GASTROESOPHAGEAL REFLUX DISEASE WITH ESOPHAGITIS WITHOUT HEMORRHAGE: Primary | ICD-10-CM

## 2021-12-17 DIAGNOSIS — R03.0 ELEVATED BLOOD PRESSURE READING WITHOUT DIAGNOSIS OF HYPERTENSION: ICD-10-CM

## 2021-12-17 PROCEDURE — 99214 OFFICE O/P EST MOD 30 MIN: CPT | Mod: 25 | Performed by: INTERNAL MEDICINE

## 2021-12-17 PROCEDURE — 90714 TD VACC NO PRESV 7 YRS+ IM: CPT | Performed by: INTERNAL MEDICINE

## 2021-12-17 PROCEDURE — 90471 IMMUNIZATION ADMIN: CPT | Performed by: INTERNAL MEDICINE

## 2021-12-17 RX ORDER — AMLODIPINE BESYLATE 2.5 MG/1
2.5 TABLET ORAL DAILY
Qty: 90 TABLET | Refills: 1 | Status: SHIPPED | OUTPATIENT
Start: 2021-12-17 | End: 2022-04-17

## 2021-12-17 NOTE — PROGRESS NOTES
[facility] Follow Up Note-end of year follow-up for chronic medical problems    Assessment/Plan:  1. Gastroesophageal reflux disease with esophagitis without hemorrhage  We will continue omeprazole.  Will discontinue aspirin as she does not have a primary coronary artery disease and will continue to monitor symptoms.  - omeprazole (PRILOSEC) 20 MG DR capsule; Take 1 capsule (20 mg) by mouth daily before breakfast  Dispense: 90 capsule; Refill: 3    2. Raynaud's disease without gangrene  We will try some low-dose amlodipine-may help smooth out blood pressure  - amLODIPine (NORVASC) 2.5 MG tablet; Take 1 tablet (2.5 mg) by mouth daily  Dispense: 90 tablet; Refill: 1    3. Elevated blood pressure reading without diagnosis of hypertension  Blood pressure significantly elevated today-but completely normal yesterday.  Recommendation: Amlodipine for Raynaud's-may help blood pressure elevations.  She will do home blood pressure checks.    Would like nursing to do blood pressure check next week when she is here for Prolia  - amLODIPine (NORVASC) 2.5 MG tablet; Take 1 tablet (2.5 mg) by mouth daily  Dispense: 90 tablet; Refill: 1    4. ILD (interstitial lung disease) (H)  Notes from Dr. Gamboa reviewed    5. Vasomotor rhinitis  Continue Clarinex-do sinus rinses      Other problems of renal lithiasis-status post knee replacement/high risk bone density are [id number] in the spring      Wendy Garcia MD, MD    Chief Complaint:  Chief Complaint   Patient presents with     Follow Up       History of Present Illness:  Shelbie is a 81 year old female who is here today for follow-up of her usual medical problems.  Overall, she is doing well.  Is been somewhat of a difficult year and that she had surgery for kidney stones x2 as well as a knee replacement.  She has been evaluated for interstitial lung disease which was incidentally found as well.  Currently, she is doing well.  She does have some concerns with  regard to symptoms of Raynaud's and color changes in her extremities when exposed to cold.  At last visit, we discussed amlodipine.  She would like to give this a try.  She has questions regarding the potential side effects.  Most common side effect is lower extremity edema and this was reviewed.    She is otherwise doing well.  She is looking forward to the holidays.  She requires a tetanus shot.    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       bisacodyl (DULCOLAX) 5 MG EC tablet Take 5 mg by mouth daily as needed for constipation       calcium citrate/vitamin D3 (CALCIUM CITRATE + D ORAL) [CALCIUM CITRATE/VITAMIN D3 (CALCIUM CITRATE + D ORAL)] Take by mouth.       cholecalciferol, vitamin D3, (VITAMIN D3) 50 mcg (2,000 unit) capsule [CHOLECALCIFEROL, VITAMIN D3, (VITAMIN D3) 50 MCG (2,000 UNIT) CAPSULE] Take 1,000 Units by mouth daily.       co-enzyme Q-10 100 MG CAPS capsule Take 100 mg by mouth daily       desloratadine (CLARINEX) 5 MG tablet TAKE ONE TABLET BY MOUTH EVERY DAY 30 tablet 1     omega-3/dha/epa/dpa/fish oil (OMEGA-3 2100 ORAL) [OMEGA-3/DHA/EPA/DPA/FISH OIL (OMEGA-3 2100 ORAL)] Take by mouth.       omeprazole (PRILOSEC) 20 MG DR capsule Take 1 capsule (20 mg) by mouth daily before breakfast 90 capsule 3     polyethylene glycol (MIRALAX) 17 gram  packet [POLYETHYLENE GLYCOL (MIRALAX) 17 GRAM PACKET] Take 17 g by mouth daily.       rosuvastatin (CRESTOR) 5 MG tablet Take 1 tablet (5 mg) by mouth daily 90 tablet 0     timoloL maleate (TIMOPTIC) 0.5 % ophthalmic solution [TIMOLOL MALEATE (TIMOPTIC) 0.5 % OPHTHALMIC SOLUTION] 1 drop 2 (two) times a day.       UNABLE TO FIND [UNABLE TO FIND] Med Name: multi collagen joint mobility         Family History:     Physical Exam:  General Appearance:     Vitals:    12/17/21 1113 12/17/21 1118 12/17/21 1134   BP: (!) 170/60 (!) 180/80 (!) 158/78   BP Location: Left arm Left arm    Patient Position: Sitting Sitting    Cuff Size: Adult Regular Adult Regular    Pulse: 66     SpO2: 100%     Weight: 56.9 kg (125 lb 8 oz)       Wt Readings from Last 3 Encounters:   12/17/21 56.9 kg (125 lb 8 oz)   12/16/21 57.4 kg (126 lb 9.6 oz)   09/14/21 55.7 kg (122 lb 12.8 oz)     Body mass index is 22.95 kg/m .    She appears well and in no acute distress

## 2021-12-23 ENCOUNTER — TELEPHONE (OUTPATIENT)
Dept: INTERNAL MEDICINE | Facility: CLINIC | Age: 81
End: 2021-12-23
Payer: MEDICARE

## 2021-12-23 DIAGNOSIS — Z92.29 PERSONAL HISTORY OF OTHER DRUG THERAPY: ICD-10-CM

## 2021-12-23 DIAGNOSIS — M81.0 SENILE OSTEOPOROSIS: Primary | ICD-10-CM

## 2021-12-27 ENCOUNTER — TRANSFERRED RECORDS (OUTPATIENT)
Dept: HEALTH INFORMATION MANAGEMENT | Facility: CLINIC | Age: 81
End: 2021-12-27
Payer: MEDICARE

## 2021-12-28 ENCOUNTER — TELEPHONE (OUTPATIENT)
Dept: INTERNAL MEDICINE | Facility: CLINIC | Age: 81
End: 2021-12-28
Payer: MEDICARE

## 2021-12-28 NOTE — TELEPHONE ENCOUNTER
"Prolia Injection Phone Screen      Screening questions have been asked 2-3 days prior to administration visit for Prolia. If any questions are answered with \"Yes,\" this phone encounter were will routed to ordering provider for further evaluation.     1.  When was the last injection?  6/31/21    2.  Has insurance for this injection been verified?  Yes    3.  Did you experience any new onset achiness or rashes that lasted for over a month with your previous Prolia injection?   No    4.  Do you have a fever over 101?F or a new deep cough that is unusual for you today? No    5.  Have you started any new medications in the last 6 months that you were told could affect your immune system? These may have been prescribed by oncologist, transplant, rheumatology, or dermatology.   No    6.  In the last 6 months have you have gastric bypass or parathyroid surgery?   No    7.  Do you plan dental work requiring drilling into the bone such as implants/extractions or oral surgery in the next 2-3 months?   No    8. Do you have new insurance since the last injection?  No  9. Have you received the Covid-19 vaccine? Yes  If No - Proceed with Prolia injection  If Yes - Date of vaccination 9/14/21. Will need to wait until 2 weeks after 2nd dose of Covid-19 vaccine before administering Prolia       Patient informed if symptoms discussed above present prior to their administration appointment, they are to notify clinic immediately.     Laura Sarabia              "

## 2021-12-29 ENCOUNTER — E-VISIT (OUTPATIENT)
Dept: INTERNAL MEDICINE | Facility: CLINIC | Age: 81
End: 2021-12-29
Payer: MEDICARE

## 2021-12-29 ENCOUNTER — NURSE TRIAGE (OUTPATIENT)
Dept: NURSING | Facility: CLINIC | Age: 81
End: 2021-12-29
Payer: MEDICARE

## 2021-12-29 DIAGNOSIS — Z53.9 ERRONEOUS ENCOUNTER--DISREGARD: Primary | ICD-10-CM

## 2021-12-29 NOTE — TELEPHONE ENCOUNTER
Patient calling with concerns of mild covid symptoms and exposure to confirmed covid person on [place]. Patient has a dry cough that started last evening. Patient has injection scheduled for tomorrow. Encouraged patient to reschedule for after the 10 days from start of symptoms.   Patient requesting covid testing order but is not comfortable doing the E-visit through [facility]. Patient asking for PCP to order.   Routing request to PCP with call back to patient at [phone] ok to leave detailed message.   Transferred to scheduling to reschedule prolia injection.   Colleen Martinez RN   12/29/21 2:43 PM  [facility] Nurse Advisor    Reason for Disposition    HIGH RISK for severe COVID complications (e.g., age > 64 years, obesity with BMI > 25, pregnant, chronic lung disease or other chronic medical condition)  (Exception: Already seen by PCP and no new or worsening symptoms.)    Additional Information    Negative: SEVERE difficulty breathing (e.g., struggling for each breath, speaks in single words)    Negative: Difficult to awaken or acting confused (e.g., disoriented, slurred speech)    Negative: Bluish (or gray) lips or face now    Negative: Shock suspected (e.g., cold/pale/clammy skin, too weak to stand, low BP, rapid pulse)    Negative: Sounds like a life-threatening emergency to the triager    Negative: [1] COVID-19 exposure AND [2] no symptoms    Negative: COVID-19 vaccine reaction suspected (e.g., fever, headache, muscle aches) occurring 1 to 3 days after getting vaccine    Negative: COVID-19 vaccine, questions about    Negative: [1] Lives with someone known to have influenza (flu test positive) AND [2] flu-like symptoms (e.g., cough, runny nose, sore throat, SOB; with or without fever)    Negative: [1] Adult with possible COVID-19 symptoms AND [2] triager concerned about severity of symptoms or other causes    Negative: COVID-19 and breastfeeding, questions about    Negative: SEVERE or constant chest  pain or pressure (Exception: mild central chest pain, present only when coughing)    Negative: MODERATE difficulty breathing (e.g., speaks in phrases, SOB even at rest, pulse 100-120)    Negative: [1] Headache AND [2] stiff neck (can't touch chin to chest)    Negative: MILD difficulty breathing (e.g., minimal/no SOB at rest, SOB with walking, pulse <100)    Negative: Chest pain or pressure    Negative: Patient sounds very sick or weak to the triager    Negative: Fever > 103 F (39.4 C)    Negative: [1] Fever > 101 F (38.3 C) AND [2] age > 60 years    Negative: [1] Fever > 100.0 F (37.8 C) AND [2] bedridden (e.g., nursing home patient, CVA, chronic illness, recovering from surgery)    Protocols used: CORONAVIRUS (COVID-19) DIAGNOSED OR [id number] 8.25.2021

## 2021-12-29 NOTE — TELEPHONE ENCOUNTER
Contacted patient and encouraged her to do an E-visit to assess symptoms and have Covid test ordered. Discussed with patient that Jose Ireland is out of the clinic until next week.  Patient states she will do the E-visit and call back with any concerns or questions.

## 2021-12-30 ENCOUNTER — TELEPHONE (OUTPATIENT)
Dept: PULMONOLOGY | Facility: OTHER | Age: 81
End: 2021-12-30
Payer: MEDICARE

## 2021-12-30 DIAGNOSIS — U07.1 INFECTION DUE TO 2019 NOVEL CORONAVIRUS: Primary | ICD-10-CM

## 2021-12-30 RX ORDER — ALBUTEROL SULFATE 90 UG/1
2 AEROSOL, METERED RESPIRATORY (INHALATION) EVERY 6 HOURS
Qty: 18 G | Refills: 3 | Status: SHIPPED | OUTPATIENT
Start: 2021-12-30 | End: 2022-12-07

## 2021-12-30 NOTE — TELEPHONE ENCOUNTER
Called back and spoke with Shelbie.  Per Dr. Lo:  We can order an albuterol inhaler for her for prn for cough if she would like?      Shelbie would like to try the albuterol inhaler.    Orders sent to her pharmacy.

## 2021-12-30 NOTE — TELEPHONE ENCOUNTER
"Phone call from Shelbie.  States that she tested positive for COVID yesterday.  Has had symptoms since Tuesday, dec.28.    She has applied for the monoclonal antibodies, but states she is in a \"holding pool\".    Has nasal congestion as well as dry cough.  Wanted to let Dr. Lo know and ask if there is anything further she can do?  Cough is what is bothering her the most.  "

## 2022-01-04 NOTE — TELEPHONE ENCOUNTER
Provider E-Visit time total (minutes): discontinue this encounter- sent in my absence-  Issue resolved

## 2022-01-12 ENCOUNTER — ALLIED HEALTH/NURSE VISIT (OUTPATIENT)
Dept: FAMILY MEDICINE | Facility: CLINIC | Age: 82
End: 2022-01-12
Payer: MEDICARE

## 2022-01-12 VITALS — HEIGHT: 62 IN | WEIGHT: 121 LBS | BODY MASS INDEX: 22.26 KG/M2

## 2022-01-12 DIAGNOSIS — M85.80 OSTEOPENIA: Primary | ICD-10-CM

## 2022-01-12 PROCEDURE — 99207 PR NO CHARGE NURSE ONLY: CPT

## 2022-01-12 PROCEDURE — 96372 THER/PROPH/DIAG INJ SC/IM: CPT | Performed by: INTERNAL MEDICINE

## 2022-01-12 NOTE — PROGRESS NOTES
Clinic Administered Medication Documentation    Administrations This Visit     denosumab (PROLIA) injection 60 mg     Admin Date  01/12/2022 Action  Given Dose  60 mg Route  Subcutaneous Site  Left Arm Administered By  Alexa Perdue MA    Ordering Provider: Paul Holm MD    Patient Supplied?: No    Comments: patient toloraed well                  Prolia Documentation    Prior to injection, verified patient identity using patient's name and date of birth. Medication was administered. Please see MAR and medication order for additional information. Patient instructed to stay in clinic after the injection but patient declined.    Indication: Prolia  (denosumab) is a prescription medicine used to treat osteoporosis in patients who:     Are at high risk for fracture, meaning patients who have had a fracture related to osteoporosis, or who have multiple risk factors for fracture.    Cannot use another osteoporosis medicine or other osteoporosis medicines did not work well.    The timeline for early/late injections would be 4 weeks early and any time after the 6 month hiren. If a patient receives their injection late, then the subsequent injection would be 6 months from the date that they actually received the injection.    When was the last injection?  06/30/2021  Was the last injection at least 6 months ago? Yes  Has the prior authorization been completed?  Yes  Is there an active order (within the past 365 days) in the chart?  Yes  Patient denied having dental work involving the bone in the past 6 months?  Yes  Patient denies a plan to dental work involving the bone in the next 6 months? Yes    The following steps were completed to comply with the REMS program for Prolia:    Confirms that patient received education from RN or provider via the Medication Guide and Patient Counseling Chart, including the serious risks of Prolia  and the symptoms of each risk.    Told the patient to seek prompt medical attention  if they have signs or symptoms of any of the serious risks, as described in the Medication Guide and Patient Counseling Chart that was reviewed between the patient and RN or LP.    Provided each patient a copy of the Medication Guide and Patient Brochure.      Was entire vial of medication used? Yes  Vial/Syringe: Syringe  Expiration Date:  04/04/2024  Was the medication not being billed by clinic? No                Name of provider who requested the medication administration: Dr. Garcia  Name of provider on site (faculty or community preceptor) at the time of performing the medication administration: Dr. Garcia    Date of next administration: 6 months  Date of next office visit with provider to renew medication plan (must be seen annually): 12/17/21

## 2022-01-18 VITALS
HEART RATE: 78 BPM | WEIGHT: 122.3 LBS | DIASTOLIC BLOOD PRESSURE: 80 MMHG | HEIGHT: 62 IN | OXYGEN SATURATION: 98 % | HEIGHT: 62 IN | RESPIRATION RATE: 18 BRPM | SYSTOLIC BLOOD PRESSURE: 148 MMHG | HEART RATE: 69 BPM | OXYGEN SATURATION: 98 % | BODY MASS INDEX: 22.5 KG/M2 | SYSTOLIC BLOOD PRESSURE: 132 MMHG | BODY MASS INDEX: 22.31 KG/M2 | WEIGHT: 121.25 LBS | DIASTOLIC BLOOD PRESSURE: 82 MMHG

## 2022-01-18 VITALS
DIASTOLIC BLOOD PRESSURE: 74 MMHG | OXYGEN SATURATION: 99 % | WEIGHT: 121 LBS | SYSTOLIC BLOOD PRESSURE: 150 MMHG | BODY MASS INDEX: 22.26 KG/M2 | HEIGHT: 62 IN | HEART RATE: 74 BPM

## 2022-01-18 VITALS
HEART RATE: 78 BPM | TEMPERATURE: 98.6 F | BODY MASS INDEX: 22.26 KG/M2 | HEIGHT: 62 IN | SYSTOLIC BLOOD PRESSURE: 122 MMHG | DIASTOLIC BLOOD PRESSURE: 60 MMHG | OXYGEN SATURATION: 98 % | WEIGHT: 121 LBS

## 2022-01-18 VITALS
DIASTOLIC BLOOD PRESSURE: 72 MMHG | HEART RATE: 78 BPM | TEMPERATURE: 98.3 F | BODY MASS INDEX: 22.22 KG/M2 | SYSTOLIC BLOOD PRESSURE: 128 MMHG | WEIGHT: 121.5 LBS

## 2022-01-18 VITALS
SYSTOLIC BLOOD PRESSURE: 112 MMHG | BODY MASS INDEX: 21.71 KG/M2 | DIASTOLIC BLOOD PRESSURE: 66 MMHG | HEIGHT: 62 IN | HEART RATE: 70 BPM | WEIGHT: 118 LBS | OXYGEN SATURATION: 97 %

## 2022-01-18 VITALS
DIASTOLIC BLOOD PRESSURE: 74 MMHG | WEIGHT: 121 LBS | BODY MASS INDEX: 22.13 KG/M2 | OXYGEN SATURATION: 98 % | HEART RATE: 72 BPM | SYSTOLIC BLOOD PRESSURE: 158 MMHG | TEMPERATURE: 97 F

## 2022-02-28 DIAGNOSIS — J30.89 SEASONAL ALLERGIC RHINITIS DUE TO OTHER ALLERGIC TRIGGER: ICD-10-CM

## 2022-03-02 RX ORDER — DESLORATADINE 5 MG/1
TABLET ORAL
Qty: 30 TABLET | Refills: 1 | Status: SHIPPED | OUTPATIENT
Start: 2022-03-02

## 2022-03-02 NOTE — TELEPHONE ENCOUNTER
"Routing refill request to provider for review/approval because:  Age warning    Last Written Prescription Date:  12/6/21  Last Fill Quantity: 30,  # refills: 1   Last office visit provider:  12/17/21     Requested Prescriptions   Pending Prescriptions Disp Refills     desloratadine (CLARINEX) 5 MG tablet [Pharmacy Med Name: DESLORATADINE 5MG TABS] 30 tablet 1     Sig: TAKE ONE TABLET BY MOUTH EVERY DAY       Antihistamines Protocol Failed - 2/28/2022  3:03 PM        Failed - Patient is 3-64 years of age     Apply weight-based dosing for peds patients age 3 - 12 years of age.    Forward request to provider for patients under the age of 3 or over the age of 64.          Passed - Recent (12 mo) or future (30 days) visit within the authorizing provider's specialty     Patient has had an office visit with the authorizing provider or a provider within the authorizing providers department within the previous 12 mos or has a future within next 30 days. See \"Patient Info\" tab in inbasket, or \"Choose Columns\" in Meds & Orders section of the refill encounter.              Passed - Medication is active on med list             Harika Stiles RN 03/02/22 1:47 PM  "

## 2022-04-11 ENCOUNTER — TELEPHONE (OUTPATIENT)
Dept: INTERNAL MEDICINE | Facility: CLINIC | Age: 82
End: 2022-04-11

## 2022-04-11 DIAGNOSIS — M19.90 INFLAMMATORY OSTEOARTHRITIS: Primary | ICD-10-CM

## 2022-04-11 NOTE — TELEPHONE ENCOUNTER
Reason for Call:  Medication or medication refill:    Do you use a [facility]?  Name of the pharmacy and phone number for the current request:  [email or web address] pharm    Name of the medication requested:   celecoxib (CELEBREX) 100 MG capsule (Discontinued)   9/2/2020 8/11/2021 No   Sig - Route: [CELECOXIB (CELEBREX) 100 MG CAPSULE] Take 100 mg by mouth 2 (two) times a day. - Oral         Other request: Dr Navarrete used to prescribe and Dr Garcia told pt she would.    Can we leave a detailed message on this number? YES    Phone number patient can be reached at: Cell number on file:    Telephone Information:   Mobile [phone]       Best Time: any    Call taken on 4/11/2022 at 11:58 AM by Pam J. Behr

## 2022-04-12 RX ORDER — CELECOXIB 100 MG/1
100 CAPSULE ORAL 2 TIMES DAILY
Qty: 60 CAPSULE | Refills: 1 | Status: SHIPPED | OUTPATIENT
Start: 2022-04-12 | End: 2022-07-14

## 2022-04-15 DIAGNOSIS — I73.00 RAYNAUD'S DISEASE WITHOUT GANGRENE: ICD-10-CM

## 2022-04-15 DIAGNOSIS — R03.0 ELEVATED BLOOD PRESSURE READING WITHOUT DIAGNOSIS OF HYPERTENSION: ICD-10-CM

## 2022-04-17 NOTE — TELEPHONE ENCOUNTER
"Routing refill request to provider for review/approval because:  Labs out of range:  Creatinine and BP out of range.    Last Written Prescription Date:  12/17/21  Last Fill Quantity: 90,  # refills: 1   Last office visit provider: 12/17/21    Requested Prescriptions   Pending Prescriptions Disp Refills     amLODIPine (NORVASC) 2.5 MG tablet [Pharmacy Med Name: AMLODIPINE BESYLATE 2.5MG TABS] 90 tablet 1     Sig: TAKE ONE TABLET BY MOUTH EVERY DAY       Calcium Channel Blockers Protocol  Failed - 4/15/2022  8:36 AM        Failed - Blood pressure under 140/90 in past 12 months     BP Readings from Last 3 Encounters:   12/17/21 (!) 158/78   12/16/21 126/72   09/14/21 128/70                 Failed - Normal serum creatinine on file in past 12 months     Recent Labs   Lab Test 08/11/21  1230   CR 0.59*       Ok to refill medication if creatinine is low          Passed - Recent (12 mo) or future (30 days) visit within the authorizing provider's specialty     Patient has had an office visit with the authorizing provider or a provider within the authorizing providers department within the previous 12 mos or has a future within next 30 days. See \"Patient Info\" tab in inbasket, or \"Choose Columns\" in Meds & Orders section of the refill encounter.              Passed - Medication is active on med list        Passed - Patient is age 18 or older        Passed - No active pregnancy on record        Passed - No positive pregnancy test in past 12 months             Shaila Ferris RN 04/17/22 3:49 PM  "
done

## 2022-04-18 RX ORDER — AMLODIPINE BESYLATE 2.5 MG/1
TABLET ORAL
Qty: 90 TABLET | Refills: 2 | Status: SHIPPED | OUTPATIENT
Start: 2022-04-18

## 2022-06-08 DIAGNOSIS — E78.5 HYPERLIPIDEMIA LDL GOAL <100: ICD-10-CM

## 2022-06-09 RX ORDER — ROSUVASTATIN CALCIUM 5 MG/1
TABLET, COATED ORAL
Qty: 90 TABLET | Refills: 0 | Status: SHIPPED | OUTPATIENT
Start: 2022-06-09 | End: 2022-11-02

## 2022-06-09 NOTE — TELEPHONE ENCOUNTER
"Routing refill request to provider for review/approval because:  Labs not current:  LDL  Break in medication    Last Written Prescription Date:  9/14/21  Last Fill Quantity: 90,  # refills: 0   Last office visit provider:  12/17/21     Requested Prescriptions   Pending Prescriptions Disp Refills     rosuvastatin (CRESTOR) 5 MG tablet [Pharmacy Med Name: ROSUVASTATIN CALCIUM 5MG TABS] 90 tablet 0     Sig: TAKE ONE TABLET BY MOUTH EVERY DAY       Statins Protocol Failed - 6/8/2022  8:23 AM        Failed - LDL on file in past 12 months     Recent Labs   Lab Test 09/02/20  1007                Passed - No abnormal creatine kinase in past 12 months     No lab results found.             Passed - Recent (12 mo) or future (30 days) visit within the authorizing provider's specialty     Patient has had an office visit with the authorizing provider or a provider within the authorizing providers department within the previous 12 mos or has a future within next 30 days. See \"Patient Info\" tab in inbasket, or \"Choose Columns\" in Meds & Orders section of the refill encounter.              Passed - Medication is active on med list        Passed - Patient is age 18 or older        Passed - No active pregnancy on record        Passed - No positive pregnancy test in past 12 months             Ange Edmond RN 06/09/22 7:19 AM  "

## 2022-07-14 ENCOUNTER — ALLIED HEALTH/NURSE VISIT (OUTPATIENT)
Dept: FAMILY MEDICINE | Facility: CLINIC | Age: 82
End: 2022-07-14
Payer: MEDICARE

## 2022-07-14 DIAGNOSIS — M85.80 OSTEOPENIA: Primary | ICD-10-CM

## 2022-07-14 PROCEDURE — 96372 THER/PROPH/DIAG INJ SC/IM: CPT | Performed by: INTERNAL MEDICINE

## 2022-07-14 PROCEDURE — 99207 PR NO CHARGE NURSE ONLY: CPT

## 2022-07-14 NOTE — PROGRESS NOTES
"Prolia Injection Phone Screen      Screening questions have been asked 2-3 days prior to administration visit for Prolia. If any questions are answered with \"Yes,\" this phone encounter were will routed to ordering provider for further evaluation.     1.  When was the last injection?  1/12/22    2.  Has insurance for this injection been verified?  Yes    3.  Did you experience any new onset achiness or rashes that lasted for over a month with your previous Prolia injection?   No    4.  Do you have a fever over 101?F or a new deep cough that is unusual for you today? No    5.  Have you started any new medications in the last 6 months that you were told could affect your immune system? These may have been prescribed by oncologist, transplant, rheumatology, or dermatology.   No    6.  In the last 6 months have you have gastric bypass or parathyroid surgery?   No    7.  Do you plan dental work requiring drilling into the bone such as implants/extractions or oral surgery in the next 2-3 months?   No    8. Do you have new insurance since the last injection?    9. Have you received the Covid-19 vaccine? Yes  If No - Proceed with Prolia injection  If Yes - Date of vaccination 9/14/21. Will need to wait until 2 weeks after 2nd dose of Covid-19 vaccine before administering Prolia       Patient informed if symptoms discussed above present prior to their administration appointment, they are to notify clinic immediately.     ANIL MAYER LPN    Clinic Administered Medication Documentation          Prolia Documentation    Prior to injection, verified patient identity using patient's name and date of birth. Medication was administered. Please see MAR and medication order for additional information. Patient instructed to report any adverse reaction to staff immediately .    Indication: Prolia  (denosumab) is a prescription medicine used to treat osteoporosis in patients who:     Are at high risk for fracture, meaning patients " who have had a fracture related to osteoporosis, or who have multiple risk factors for fracture.    Cannot use another osteoporosis medicine or other osteoporosis medicines did not work well.    The timeline for early/late injections would be 4 weeks early and any time after the 6 month hiren. If a patient receives their injection late, then the subsequent injection would be 6 months from the date that they actually received the injection.    When was the last injection?  01/12/22  Was the last injection at least 6 months ago? Yes  Has the prior authorization been completed?  Yes  Is there an active order (within the past 365 days) in the chart?  Yes  Patient denies any dental work involving the bone (e.g. tooth extraction or dental implants) in the past 4 weeks?  Yes  Patient denies plans for any dental work involving the bone (e.g. tooth extraction or dental implants) in the next 4 weeks? Yes    The following steps were completed to comply with the REMS program for Prolia:    Confirms that patient received education from RN or provider via the Medication Guide and Patient Counseling Chart, including the serious risks of Prolia  and the symptoms of each risk.    Told the patient to seek prompt medical attention if they have signs or symptoms of any of the serious risks, as described in the Medication Guide and Patient Counseling Chart that was reviewed between the patient and RN or LP.    Provided each patient a copy of the Medication Guide and Patient Brochure.      Was entire vial of medication used? Yes  Vial/Syringe: Syringe  Expiration Date:  07/31/2024  Was this medication supplied by the patient? No

## 2022-08-29 DIAGNOSIS — Z00.00 ENCOUNTER FOR PREVENTIVE CARE: Primary | ICD-10-CM

## 2022-08-30 RX ORDER — AMOXICILLIN 500 MG/1
CAPSULE ORAL
Qty: 20 CAPSULE | Refills: 0 | Status: SHIPPED | OUTPATIENT
Start: 2022-08-30 | End: 2023-01-16

## 2022-08-30 NOTE — TELEPHONE ENCOUNTER
"Routing refill request to provider for review/approval because:  Diagnosis warning    Last Written Prescription Date:  5/25/21  Last Fill Quantity: 20,  # refills: 0   Last office visit provider:  12/17/21     Requested Prescriptions   Pending Prescriptions Disp Refills     amoxicillin (AMOXIL) 500 MG capsule [Pharmacy Med Name: AMOXICILLIN 500MG CAPS] 20 capsule 0     Sig: TAKE FOUR CAPSULES BY MOUTH ABOUT 30 MINUTES BEFORE DENTAL PROCEDURE       Oral Acne/Rosacea Medications Protocol Failed - 8/30/2022  8:33 AM        Failed - Confirmation of diagnosis is required     Please confirm diagnosis is acne or rosacea.     If NOT acne or rosacea; refer request to provider for further evaluation.    If diagnosis IS acne or rosacea, OK to refill BASED ON PREVIOUS REFILL CLINICAL NOTE RECOMMENDATION.          Passed - Patient is 12 years of age or older        Passed - Recent (12 mo) or future (30 days) visit within the authorizing provider's specialty     Patient has had an office visit with the authorizing provider or a provider within the authorizing providers department within the previous 12 mos or has a future within next 30 days. See \"Patient Info\" tab in inbasket, or \"Choose Columns\" in Meds & Orders section of the refill encounter.              Passed - Medication is active on med list        Passed - No active pregnancy on record        Passed - No positive prenancy test is past 12 months             Mo Leigh RN 08/30/22 8:34 AM  "

## 2022-10-23 ENCOUNTER — HEALTH MAINTENANCE LETTER (OUTPATIENT)
Age: 82
End: 2022-10-23

## 2022-11-01 DIAGNOSIS — E78.5 HYPERLIPIDEMIA LDL GOAL <100: ICD-10-CM

## 2022-11-02 RX ORDER — ROSUVASTATIN CALCIUM 5 MG/1
5 TABLET, COATED ORAL DAILY
Qty: 90 TABLET | Refills: 0 | Status: SHIPPED | OUTPATIENT
Start: 2022-11-02

## 2022-11-02 NOTE — TELEPHONE ENCOUNTER
"Routing refill request to provider for review/approval because:  Labs not current:  LDL    Last Written Prescription Date:  6/9/22  Last Fill Quantity: 90,  # refills: 0   Last office visit provider:  12/17/21     Requested Prescriptions   Pending Prescriptions Disp Refills     rosuvastatin (CRESTOR) 5 MG tablet [Pharmacy Med Name: ROSUVASTATIN CALCIUM 5MG TABS] 90 tablet 0     Sig: TAKE ONE TABLET BY MOUTH EVERY DAY       Statins Protocol Failed - 11/2/2022  2:09 PM        Failed - LDL on file in past 12 months     Recent Labs   Lab Test 09/02/20  1007                Passed - No abnormal creatine kinase in past 12 months     No lab results found.             Passed - Recent (12 mo) or future (30 days) visit within the authorizing provider's specialty     Patient has had an office visit with the authorizing provider or a provider within the authorizing providers department within the previous 12 mos or has a future within next 30 days. See \"Patient Info\" tab in inbasket, or \"Choose Columns\" in Meds & Orders section of the refill encounter.              Passed - Medication is active on med list        Passed - Patient is age 18 or older        Passed - No active pregnancy on record        Passed - No positive pregnancy test in past 12 months             Mo Leigh RN 11/02/22 2:09 PM  "

## 2022-11-03 DIAGNOSIS — K21.00 GASTROESOPHAGEAL REFLUX DISEASE WITH ESOPHAGITIS WITHOUT HEMORRHAGE: ICD-10-CM

## 2022-11-04 NOTE — TELEPHONE ENCOUNTER
"Last Written Prescription Date:  12/17/21  Last Fill Quantity: 90,  # refills: 3   Last office visit provider:   12/17/21    Requested Prescriptions   Pending Prescriptions Disp Refills     omeprazole (PRILOSEC) 20 MG DR capsule [Pharmacy Med Name: OMEPRAZOLE 20MG CPDR] 90 capsule 3     Sig: TAKE ONE CAPSULE BY MOUTH EVERY DAY BEFORE BREAKFAST       PPI Protocol Passed - 11/3/2022  8:59 AM        Passed - Not on Clopidogrel (unless Pantoprazole ordered)        Passed - No diagnosis of osteoporosis on record        Passed - Recent (12 mo) or future (30 days) visit within the authorizing provider's specialty     Patient has had an office visit with the authorizing provider or a provider within the authorizing providers department within the previous 12 mos or has a future within next 30 days. See \"Patient Info\" tab in inbasket, or \"Choose Columns\" in Meds & Orders section of the refill encounter.              Passed - Medication is active on med list        Passed - Patient is age 18 or older        Passed - No active pregnacy on record        Passed - No positive pregnancy test in past 12 months             Court Pichardo RN 11/04/22 3:06 PM  "

## 2022-11-30 ENCOUNTER — TELEPHONE (OUTPATIENT)
Dept: INTERNAL MEDICINE | Facility: CLINIC | Age: 82
End: 2022-11-30

## 2022-11-30 DIAGNOSIS — Z92.29 PERSONAL HISTORY OF OTHER DRUG THERAPY: ICD-10-CM

## 2022-11-30 DIAGNOSIS — M81.0 OSTEOPOROSIS: Primary | ICD-10-CM

## 2022-11-30 DIAGNOSIS — M81.0 SENILE OSTEOPOROSIS: ICD-10-CM

## 2022-11-30 NOTE — TELEPHONE ENCOUNTER
----- Message from Shanita Lawton sent at 2022  7:58 AM CST -----  Regarding: CAM order expiring  Hello,    This patient is scheduled for prolia on 23.  Their med order  on 22. Please enter a new CAM order.     Thanks!  Shanita Lawton     [facility]  HE Clinically Administered Medications

## 2022-12-05 ENCOUNTER — HOSPITAL ENCOUNTER (OUTPATIENT)
Dept: CT IMAGING | Facility: HOSPITAL | Age: 82
Discharge: HOME | End: 2022-12-05
Attending: INTERNAL MEDICINE | Admitting: INTERNAL MEDICINE
Payer: MEDICARE

## 2022-12-05 DIAGNOSIS — R91.8 PULMONARY NODULES: ICD-10-CM

## 2022-12-05 PROCEDURE — 71250 CT THORAX DX C-: CPT

## 2022-12-07 ENCOUNTER — OFFICE VISIT (OUTPATIENT)
Dept: PULMONOLOGY | Facility: CLINIC | Age: 82
End: 2022-12-07
Payer: MEDICARE

## 2022-12-07 VITALS
WEIGHT: 125.6 LBS | HEART RATE: 80 BPM | OXYGEN SATURATION: 99 % | SYSTOLIC BLOOD PRESSURE: 138 MMHG | DIASTOLIC BLOOD PRESSURE: 80 MMHG | BODY MASS INDEX: 22.97 KG/M2

## 2022-12-07 DIAGNOSIS — J31.0 CHRONIC RHINITIS: ICD-10-CM

## 2022-12-07 DIAGNOSIS — K21.9 GASTROESOPHAGEAL REFLUX DISEASE WITHOUT ESOPHAGITIS: ICD-10-CM

## 2022-12-07 DIAGNOSIS — R91.8 PULMONARY NODULES: Primary | ICD-10-CM

## 2022-12-07 PROCEDURE — 99214 OFFICE O/P EST MOD 30 MIN: CPT | Performed by: NURSE PRACTITIONER

## 2022-12-07 NOTE — PATIENT INSTRUCTIONS
It was a pleasure to see you in clinic today.   Here is what we discussed:    Okay to discontinue lung nodule monitoring, as these have been stable since 5/2021 and you have no symptoms.  Contact us with any change in your breathing.    Steph Farias, CNP  Pulmonary Medicine  [facility]  [phone]

## 2022-12-07 NOTE — PROGRESS NOTES
Pulmonary Clinic Outpatient Progress Note      Assessment/Plan:     82 y F with a history of HLD, GERD, who presents for follow-up of groundglass density micronodular opacities per chest CT.    1. Pulmonary nodules  Per 12/2022 chest CT:  Innumerable small peribronchiolar groundglass density micronodular opacities of uniform size throughout the periphery of both lungs have not changed significantly, present since at least 9/2020. Offered BAL previously, patient declined.  Negative endemic mycoses serologies.  Negative swallow evaluation.  Fungal sputum cx negative.  AFB smear negative.  She continues to be asymptomatic.  - Discussed stability of imaging, patient elects for no further monitoring.  She will contact us with any change in respiratory status.  - Follow-up in clinic PRN    2. GERD  Currently controlled.  - Continue omeprazole 20mg daily    3.  Chronic rhinitis  Now followed by allergist, ongoing issues with sinus congestion/drainage and post-nasal drip.  - Continue desloratadine and flonase daily  - Follow-up with allergist as needed    3. Health care maintenance  She is UTD with influenza, pneumococcal and COVID vaccines.      Steph Farias, CNP  Pulmonary Medicine  [facility]  [phone]      CC:     Follow-up pulmonary nodules    HPI:     82 y F with a history of HLD, GERD, who presents for follow-up of groundglass density micronodular opacities per chest CT.    Per latest chest CT on 12/5/22, nodules are stable.  No new nodules present.    She denies any cough, fevers, night sweats.  No change in respiratory status since last visit here in 12/2021.    Her GERD is controlled with omeprazole.  She continues to struggle with chronic rhinitis, but is now seeing an allergist.    Medical notes reviewed for appointment today include primary care provider notes.      ROS:     A 6-system review was obtained and was negative with the exception of the symptoms endorsed in  the history of present illness.      Past Medical History:   Diagnosis Date     Arthritis      Hiatal hernia      Past Surgical History:   Procedure Laterality Date     CHG X-RAY RETROGRADE PYELOGRAM Right 5/28/2021    Procedure: CYSTOURETEROSCOPY, WITH RETROGRADE PYELOGRAM,URETEROSCOPIC STONE EXTRACTION, AND STENT INSERTION;  Surgeon: Shaan Hinkle MD;  Location: [facility];  Service: Urology     FACIAL COSMETIC SURGERY       MAMMOPLASTY REDUCTION       RELEASE CARPAL TUNNEL       Social History     Socioeconomic History     Marital status:      Spouse name: Not on file     Number of children: Not on file     Years of education: Not on file     Highest education level: Not on file   Occupational History     Not on file   Tobacco Use     Smoking status: Never     Smokeless tobacco: Never   Substance and Sexual Activity     Alcohol use: Never     Drug use: Never     Sexual activity: Not on file   Other Topics Concern     Not on file   Social History Narrative    She is  (1951) and her  has delusional dementia and parkinson's disease and started the [employer] [facility] at .  They have 3 sons, 6 grandchildren.  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     Social Determinants of Health     Financial Resource Strain: Not on file   Food Insecurity: Not on file   Transportation Needs: Not on file   Physical Activity: Not on file   Stress: Not on file   Social Connections: Not on file   Intimate Partner Violence: Not on file   Housing Stability: Not on file         Physical Exam:     /80 (BP Location: Left arm)   Pulse 80   Wt 57 kg (125 lb 9.6 oz)   SpO2 99%   BMI 22.97 kg/m    Gen: Alert, oriented, no distress  HEENT: nasal turbinates are unremarkable, no oropharyngeal lesions, no cervical or supraclavicular lymphadenopathy, no thyromegaly  CV: RRR, no M/G/R  Resp: CTAB, no focal crackles or wheezes  Abd: soft, nontender, no palpable organomegaly  Skin: no  apparent rashes  Ext: no cyanosis, clubbing or edema  Neuro: alert, nonfocal    Labs:  Reviewed    Imaging studies:  Personally reviewed all imaging and procedures, radiology impressions listed below.    EXAM: CT CHEST W/O CONTRAST  LOCATION: [facility]  DATE/TIME: 12/5/2022 12:00 PM     INDICATION: Innumerable lung nodules. Interstitial lung disease. Asymptomatic.  COMPARISON: CTs chest 12/08/2021, 8/25/2021 and 5/23/2021.  TECHNIQUE: CT chest without IV contrast. Multiplanar reformats were obtained. Dose reduction techniques were used.  CONTRAST: None.     FINDINGS:   LUNGS AND PLEURA: Innumerable small peribronchiolar groundglass density micronodular opacities of uniform size throughout the periphery of both lungs have not changed significantly (best appreciated on the axial MIP series). Lungs are otherwise clear. No pleural effusion.  MEDIASTINUM/AXILLAE: No lymphadenopathy. Heart size normal. No pericardial effusion. Normal caliber thoracic aorta and central pulmonary arteries. Small esophageal hiatal hernia unchanged.  CORONARY ARTERY CALCIFICATION: None.  UPPER ABDOMEN: Several diminutive nonobstructing papillary tip stones in the left kidney. Small stable benign hepatic cyst requires no follow-up.  MUSCULOSKELETAL: Bones appear demineralized.                                                                   IMPRESSION:   1.  Innumerable small peribronchiolar groundglass density micronodular opacities of uniform size throughout the periphery of both lungs have not changed significantly. Findings are nonspecific but could indicate an indolent or sequela of a nonspecific   bronchiolitis.  2.  Stable subcentimeter left thyroid lobe nodule.  3.  Stable small esophageal hiatal hernia.  4.  Several diminutive nonobstructing papillary tip stones in the left kidney.     12/8/21 CT Chest:  LUNGS AND PLEURA: Stable multiple bilateral pulmonary nodules up to roughly 5 mm in size. As before, some  of these demonstrate subtle intrinsic low-attenuation or cavitation. Many of these nodules are perilymphatic in distribution along the fissures,   though others are more random. No pleural effusion or pneumothorax.  MEDIASTINUM/AXILLAE: No adenopathy. Small hiatus hernia.  UPPER ABDOMEN: Nothing acute.  MUSCULOSKELETAL: Bony demineralization.                                                       IMPRESSION:   1.  No significant change since prior. Stable pulmonary nodules.    5/23/21 High Res CT Chest:  LUNGS AND PLEURA: There are innumerable tiny faint centrilobular nodules in the periphery of the lungs and in a lower lung distribution. All of the nodules measure less than 5 mm in diameter and many of the nodules have central low-attenuation. There has   been a slow interval increase in nodules going back to 2/16/2021. No focal area of consolidation or dominant pulmonary nodule. Single tiny benign calcified granuloma left upper lobe. Mild amount of air trapping in the upper lungs. No pleural fluid.   Normal airways.  MEDIASTINUM/AXILLAE: There is a small sliding esophageal hiatal hernia. Atheromatous changes in a normal caliber thoracic aorta. No adenopathy.  CORONARY ARTERY CALCIFICATION: None.  UPPER ABDOMEN: Small low-attenuation lesions in the liver likely represent cysts.  MUSCULOSKELETAL: Degenerative disc disease throughout the spine.     IMPRESSION:   1.  Interval increase in tiny faint centrilobular nodules greater in the lower lungs and a few of which have central low-attenuation. Findings are nonspecific. Differential includes infections such as infectious bronchiolitis and mycobacterial infections including atypical mycobacterial infection. An inflammatory process such as aspiration pneumonitis is a possibility. Less likely possibilities include hypersensitivity pneumonitis and smoking-related processes such as respiratory bronchiolitis or   Langerhans' cell granulomatosis, all of which have an upper  lung predisposition.    PFT's  None

## 2022-12-10 ENCOUNTER — HEALTH MAINTENANCE LETTER (OUTPATIENT)
Age: 82
End: 2022-12-10

## 2023-01-11 DIAGNOSIS — M81.0 SENILE OSTEOPOROSIS: Primary | ICD-10-CM

## 2023-01-11 DIAGNOSIS — Z92.29 PERSONAL HISTORY OF OTHER DRUG THERAPY: ICD-10-CM

## 2023-01-16 ENCOUNTER — ALLIED HEALTH/NURSE VISIT (OUTPATIENT)
Dept: FAMILY MEDICINE | Facility: CLINIC | Age: 83
End: 2023-01-16
Payer: MEDICARE

## 2023-01-16 ENCOUNTER — MYC REFILL (OUTPATIENT)
Dept: INTERNAL MEDICINE | Facility: CLINIC | Age: 83
End: 2023-01-16

## 2023-01-16 DIAGNOSIS — Z00.00 ENCOUNTER FOR PREVENTIVE CARE: ICD-10-CM

## 2023-01-16 DIAGNOSIS — M85.80 OSTEOPENIA: Primary | ICD-10-CM

## 2023-01-16 DIAGNOSIS — K21.00 GASTROESOPHAGEAL REFLUX DISEASE WITH ESOPHAGITIS WITHOUT HEMORRHAGE: ICD-10-CM

## 2023-01-16 PROCEDURE — 96372 THER/PROPH/DIAG INJ SC/IM: CPT | Performed by: INTERNAL MEDICINE

## 2023-01-16 PROCEDURE — 99207 PR NO CHARGE NURSE ONLY: CPT

## 2023-01-16 RX ORDER — AMOXICILLIN 500 MG/1
CAPSULE ORAL
Qty: 20 CAPSULE | Refills: 0 | Status: SHIPPED | OUTPATIENT
Start: 2023-01-16

## 2023-01-16 NOTE — PROGRESS NOTES
Clinic Administered Medication Documentation      Administrations This Visit     denosumab (PROLIA) injection 60 mg     Admin Date  01/16/2023 Action  Given Dose  60 mg Route  Subcutaneous Site  Left Arm Administered By  Jagruti Goodwin RN    Ordering Provider: Christian Carrizales MD    Patient Supplied?: No                Prolia Documentation    Prior to injection, verified patient identity using patient's name and date of birth. Medication was administered. Please see MAR and medication order for additional information. Patient instructed to report any adverse reaction to staff immediately .    Indication: Prolia  (denosumab) is a prescription medicine used to treat osteoporosis in patients who:     Are at high risk for fracture, meaning patients who have had a fracture related to osteoporosis, or who have multiple risk factors for fracture.    Cannot use another osteoporosis medicine or other osteoporosis medicines did not work well.    The timeline for early/late injections would be 4 weeks early and any time after the 6 month hiren. If a patient receives their injection late, then the subsequent injection would be 6 months from the date that they actually received the injection.    When was the last injection?  07/14/2022  Was the last injection at least 6 months ago? Yes  Has the prior authorization been completed?  Yes  Is there an active order (within the past 365 days) in the chart?  Yes  Patient denies any dental work involving the bone (e.g. tooth extraction or dental implants) in the past 4 weeks?  Yes  Patient denies plans for any dental work involving the bone (e.g. tooth extraction or dental implants) in the next 4 weeks? Yes    The following steps were completed to comply with the REMS program for Prolia:    Reviewed information in the Medication Guide and Patient Counseling Chart, including the serious risks of Prolia  and the symptoms of each risk.    Advised patient to seek prompt medical  attention if they have signs or symptoms of any of the serious risks.    Provided each patient a copy of the Medication Guide and Patient Brochure.      Was entire vial of medication used? Yes  Vial/Syringe: Syringe  Expiration Date:  03/31/2025  Was this medication supplied by the patient? No

## 2023-01-16 NOTE — TELEPHONE ENCOUNTER
1/16/2023 pt would like to est care with you. Her awv is already scheduled for 2/9/2023 but doesn't include est care. Pt stated you should be ok with it because she is Dr Doyle's wife and she knows you, but could not tell me that you approved already.

## 2023-01-18 ENCOUNTER — ANCILLARY PROCEDURE (OUTPATIENT)
Dept: MAMMOGRAPHY | Facility: CLINIC | Age: 83
End: 2023-01-18
Attending: INTERNAL MEDICINE
Payer: MEDICARE

## 2023-01-18 DIAGNOSIS — Z12.31 VISIT FOR SCREENING MAMMOGRAM: ICD-10-CM

## 2023-01-18 PROCEDURE — 77063 BREAST TOMOSYNTHESIS BI: CPT | Mod: TC | Performed by: RADIOLOGY

## 2023-01-18 PROCEDURE — 77067 SCR MAMMO BI INCL CAD: CPT | Mod: TC | Performed by: RADIOLOGY

## 2023-07-06 ENCOUNTER — TELEPHONE (OUTPATIENT)
Dept: INTERNAL MEDICINE | Facility: CLINIC | Age: 83
End: 2023-07-06
Payer: MEDICARE

## 2023-07-06 NOTE — CONFIDENTIAL NOTE
7/6 called and informed pt of appt cancellation. Pt is aware and had already scheduled appt at other clinic where she established care.

## 2023-07-06 NOTE — TELEPHONE ENCOUNTER
----- Message from Jagruti Goodwin RN sent at 7/6/2023 10:16 AM CDT -----  Pt scheduled for prolia on 7/18. Pt established care with new PCP Miriam Bloom MD at [facility]. Pt will need to have prolia canceled and informed that they will need to reach out to PCP for prolia injection coordination. Thank you!

## 2023-08-21 ENCOUNTER — TRANSFERRED RECORDS (OUTPATIENT)
Dept: HEALTH INFORMATION MANAGEMENT | Facility: CLINIC | Age: 83
End: 2023-08-21

## 2023-09-06 ENCOUNTER — TRANSFERRED RECORDS (OUTPATIENT)
Dept: HEALTH INFORMATION MANAGEMENT | Facility: CLINIC | Age: 83
End: 2023-09-06
Payer: MEDICARE

## 2023-10-18 ENCOUNTER — TRANSFERRED RECORDS (OUTPATIENT)
Dept: HEALTH INFORMATION MANAGEMENT | Facility: CLINIC | Age: 83
End: 2023-10-18
Payer: MEDICARE

## 2023-10-26 ENCOUNTER — TRANSFERRED RECORDS (OUTPATIENT)
Dept: HEALTH INFORMATION MANAGEMENT | Facility: CLINIC | Age: 83
End: 2023-10-26
Payer: MEDICARE

## 2024-03-08 ENCOUNTER — TRANSFERRED RECORDS (OUTPATIENT)
Dept: HEALTH INFORMATION MANAGEMENT | Facility: CLINIC | Age: 84
End: 2024-03-08
Payer: MEDICARE

## 2024-06-08 ENCOUNTER — HEALTH MAINTENANCE LETTER (OUTPATIENT)
Age: 84
End: 2024-06-08

## 2024-06-14 ENCOUNTER — TRANSFERRED RECORDS (OUTPATIENT)
Dept: HEALTH INFORMATION MANAGEMENT | Facility: CLINIC | Age: 84
End: 2024-06-14
Payer: MEDICARE

## 2024-09-13 ENCOUNTER — TRANSFERRED RECORDS (OUTPATIENT)
Dept: HEALTH INFORMATION MANAGEMENT | Facility: CLINIC | Age: 84
End: 2024-09-13
Payer: MEDICARE

## 2024-10-02 ENCOUNTER — TRANSFERRED RECORDS (OUTPATIENT)
Dept: HEALTH INFORMATION MANAGEMENT | Facility: CLINIC | Age: 84
End: 2024-10-02
Payer: MEDICARE

## 2025-04-18 ENCOUNTER — TRANSFERRED RECORDS (OUTPATIENT)
Dept: HEALTH INFORMATION MANAGEMENT | Facility: CLINIC | Age: 85
End: 2025-04-18
Payer: MEDICARE

## 2025-04-25 ENCOUNTER — TRANSFERRED RECORDS (OUTPATIENT)
Dept: HEALTH INFORMATION MANAGEMENT | Facility: CLINIC | Age: 85
End: 2025-04-25
Payer: MEDICARE

## 2025-06-15 ENCOUNTER — HEALTH MAINTENANCE LETTER (OUTPATIENT)
Age: 85
End: 2025-06-15